# Patient Record
Sex: MALE | Race: WHITE | NOT HISPANIC OR LATINO | Employment: FULL TIME | URBAN - METROPOLITAN AREA
[De-identification: names, ages, dates, MRNs, and addresses within clinical notes are randomized per-mention and may not be internally consistent; named-entity substitution may affect disease eponyms.]

---
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
## 2021-01-04 ENCOUNTER — HEALTH MAINTENANCE LETTER (OUTPATIENT)
Age: 21
End: 2021-01-04

## 2021-02-12 ENCOUNTER — TELEPHONE (OUTPATIENT)
Dept: FAMILY MEDICINE | Facility: CLINIC | Age: 21
End: 2021-02-12

## 2021-02-12 NOTE — TELEPHONE ENCOUNTER
Reason for Call:  Other Establish Care / Dr Walker    Detailed comments: Patient previously saw Dr Espana who referred this patient to see Dr Walker as a new PCP. Please call to discuss.     Phone Number Patient can be reached at: Home number on file [phone] (home)    Best Time: any    Can we leave a detailed message on this number? YES    Call taken on 2/12/2021 at 4:47 PM by Leatha Lau

## 2021-02-17 ENCOUNTER — VIRTUAL VISIT (OUTPATIENT)
Dept: FAMILY MEDICINE | Facility: CLINIC | Age: 21
End: 2021-02-17
Payer: COMMERCIAL

## 2021-02-17 DIAGNOSIS — F90.0 ATTENTION DEFICIT HYPERACTIVITY DISORDER (ADHD), PREDOMINANTLY INATTENTIVE TYPE: ICD-10-CM

## 2021-02-17 DIAGNOSIS — F43.21 ADJUSTMENT DISORDER WITH DEPRESSED MOOD: Primary | ICD-10-CM

## 2021-02-17 PROCEDURE — 99214 OFFICE O/P EST MOD 30 MIN: CPT | Mod: TEL | Performed by: PHYSICIAN ASSISTANT

## 2021-02-17 RX ORDER — METHYLPHENIDATE HYDROCHLORIDE 54 MG/1
54 TABLET ORAL EVERY MORNING
Qty: 30 TABLET | Refills: 0 | Status: SHIPPED | OUTPATIENT
Start: 2021-02-17 | End: 2021-02-17

## 2021-02-17 RX ORDER — METHYLPHENIDATE HYDROCHLORIDE 54 MG/1
54 TABLET ORAL EVERY MORNING
Qty: 30 TABLET | Refills: 0 | Status: SHIPPED | OUTPATIENT
Start: 2021-03-17 | End: 2021-04-28

## 2021-02-17 RX ORDER — FLUOXETINE 40 MG/1
40 CAPSULE ORAL DAILY
Qty: 90 CAPSULE | Refills: 0 | Status: SHIPPED | OUTPATIENT
Start: 2021-02-17 | End: 2021-04-28

## 2021-02-17 ASSESSMENT — ANXIETY QUESTIONNAIRES
1. FEELING NERVOUS, ANXIOUS, OR ON EDGE: SEVERAL DAYS
6. BECOMING EASILY ANNOYED OR IRRITABLE: NOT AT ALL
5. BEING SO RESTLESS THAT IT IS HARD TO SIT STILL: NOT AT ALL
GAD7 TOTAL SCORE: 9
3. WORRYING TOO MUCH ABOUT DIFFERENT THINGS: NEARLY EVERY DAY
IF YOU CHECKED OFF ANY PROBLEMS ON THIS QUESTIONNAIRE, HOW DIFFICULT HAVE THESE PROBLEMS MADE IT FOR YOU TO DO YOUR WORK, TAKE CARE OF THINGS AT HOME, OR GET ALONG WITH OTHER PEOPLE: NOT DIFFICULT AT ALL
2. NOT BEING ABLE TO STOP OR CONTROL WORRYING: NEARLY EVERY DAY
7. FEELING AFRAID AS IF SOMETHING AWFUL MIGHT HAPPEN: SEVERAL DAYS

## 2021-02-17 ASSESSMENT — PATIENT HEALTH QUESTIONNAIRE - PHQ9
SUM OF ALL RESPONSES TO PHQ QUESTIONS 1-9: 9
5. POOR APPETITE OR OVEREATING: SEVERAL DAYS

## 2021-02-17 NOTE — PROGRESS NOTES
Landon is a 20 year old who is being evaluated via a billable telephone visit.      What phone number would you like to be contacted at? [phone]  How would you like to obtain your AVS? Mail a copy    Assessment & Plan       ICD-10-CM    1. Adjustment disorder with depressed mood  F43.21 FLUoxetine (PROZAC) 40 MG capsule   2. Attention deficit hyperactivity disorder (ADHD), predominantly inattentive type  F90.0 methylphenidate (CONCERTA) 54 MG CR tablet     DISCONTINUED: methylphenidate (CONCERTA) 54 MG CR tablet     Patient is tolerating current medication without any major side effects or concerns. Continue current treatment without any changes. Refills as above. Concerta sent x2, March refill will be kept on file so patient has enough supply to get to his appointment with Dr Walker. Further refills per new PCP.    Return in about 5 weeks (around 3/25/2021) for \[facility] [place] [facility]\"" care appt as scheduled.    NATASHA Rodriguez Redwood LLCREBECCA Gonzalez   Landon is a 20 year old who presents for the following health issues     HPI       Depression Followup - Fluoxetine     How are you doing with your depression since your last visit? No change    Are you having other symptoms that might be associated with depression? No    Have you had a significant life event?  No     Are you feeling anxious or having panic attacks?   Yes:  barely    Do you have any concerns with your use of alcohol or other drugs? No    Social History     Tobacco Use     Smoking status: Never Smoker     Smokeless tobacco: Never Used   Substance Use Topics     Alcohol use: Not Currently     Drug use: Never     PHQ 2/17/2021   PHQ-9 Total Score 9   Q9: Thoughts of better off dead/self-harm past 2 weeks Not at all     WANDER-7 SCORE 2/17/2021   Total Score 9     Last PHQ-9 2/17/2021   1.  Little interest or pleasure in doing things 1   2.  Feeling down, depressed, or hopeless 1   3.  Trouble falling or staying asleep, or sleeping  too much 3   4.  Feeling tired or having little energy 1   5.  Poor appetite or overeating 1   6.  Feeling bad about yourself 1   7.  Trouble concentrating 1   8.  Moving slowly or restless 0   Q9: Thoughts of better off dead/self-harm past 2 weeks 0   PHQ-9 Total Score 9   Difficulty at work, home, or with people Not difficult at all     WANDER-7  2/17/2021   1. Feeling nervous, anxious, or on edge 1   2. Not being able to stop or control worrying 3   3. Worrying too much about different things 3   4. Trouble relaxing 1   5. Being so restless that it is hard to sit still 0   6. Becoming easily annoyed or irritable 0   7. Feeling afraid, as if something awful might happen 1   WANDER-7 Total Score 9   If you checked any problems, how difficult have they made it for you to do your work, take care of things at home, or get along with other people? Not difficult at all       Suicide Assessment Five-step Evaluation and Treatment (SAFE-T)      How many servings of fruits and vegetables do you eat daily?  4 or more    On average, how many sweetened beverages do you drink each day (Examples: soda, juice, sweet tea, etc.  Do NOT count diet or artificially sweetened beverages)?   0    How many days per week do you exercise enough to make your heart beat faster? 2-3    How many minutes a day do you exercise enough to make your heart beat faster? 30 - 60    How many days per week do you miss taking your medication? 0    Medication Followup of Concerta    Taking Medication as prescribed: yes    Side Effects:  None    Medication Helping Symptoms:  yes     - Patient new to me. [place] [facility] [place] until [place]/25/2021. Needs refills of medications in the meantime.  - Reports mood stable on current dose of fluoxetine and it's working well. Concerta also working well for ADHD symptoms, takes daily, no drug holidays. Denies any issues with side effects.    Review of Systems    Constitutional, HEENT, cardiovascular, pulmonary, GI, , musculoskeletal, neuro, skin, endocrine and psych systems are negative, except as otherwise noted.      Objective           Vitals:  No vitals were obtained today due to virtual visit.    Physical Exam     PSYCH: Alert and oriented times 3; coherent speech, normal   rate and volume, able to articulate logical thoughts, able   to abstract reason, no tangential thoughts, no hallucinations   or delusions  His affect is normal  RESP: No cough, no audible wheezing, able to talk in full sentences  Remainder of exam unable to be completed due to telephone visits                Phone call duration: 5 minutes

## 2021-02-18 ASSESSMENT — ANXIETY QUESTIONNAIRES: GAD7 TOTAL SCORE: 9

## 2021-04-28 ENCOUNTER — VIRTUAL VISIT (OUTPATIENT)
Dept: FAMILY MEDICINE | Facility: CLINIC | Age: 21
End: 2021-04-28
Payer: COMMERCIAL

## 2021-04-28 DIAGNOSIS — F90.0 ATTENTION DEFICIT HYPERACTIVITY DISORDER (ADHD), PREDOMINANTLY INATTENTIVE TYPE: Primary | ICD-10-CM

## 2021-04-28 DIAGNOSIS — F43.21 ADJUSTMENT DISORDER WITH DEPRESSED MOOD: ICD-10-CM

## 2021-04-28 PROCEDURE — 99213 OFFICE O/P EST LOW 20 MIN: CPT | Mod: 95 | Performed by: INTERNAL MEDICINE

## 2021-04-28 RX ORDER — FLUOXETINE 40 MG/1
40 CAPSULE ORAL DAILY
Qty: 90 CAPSULE | Refills: 3 | Status: SHIPPED | OUTPATIENT
Start: 2021-04-28 | End: 2021-07-12

## 2021-04-28 RX ORDER — METHYLPHENIDATE HYDROCHLORIDE 54 MG/1
54 TABLET ORAL EVERY MORNING
Qty: 90 TABLET | Refills: 0 | Status: SHIPPED | OUTPATIENT
Start: 2021-04-28 | End: 2021-09-08

## 2021-04-28 NOTE — PROGRESS NOTES
Landon is a 20 year old who is being evaluated via a billable video visit.      How would you like to obtain your AVS? MyChart  If the video visit is dropped, the invitation should be resent by:   Will anyone else be joining your video visit?     Video Start Time: 1432    Assessment & Plan     Attention deficit hyperactivity disorder (ADHD), predominantly inattentive type  Symptoms seem to be very well controlled on 54 mg of methylphenidate once daily.  He does skip doses on his days off from work.  He is not having any negative side effects.  Continue current medication follow-up in 3 months.  - methylphenidate (CONCERTA) 54 MG CR tablet; Take 1 tablet (54 mg) by mouth every morning    Adjustment disorder with depressed mood  Mood well controlled on current Prozac, continue current dose.  - FLUoxetine (PROZAC) 40 MG capsule; Take 1 capsule (40 mg) by mouth daily      20 minutes spent on the date of the encounter doing chart review, history and exam, documentation and further activities per the note         Return in about 3 months (around 7/28/2021) for ADHD Check.  Patient instructed to return to clinic or contact us sooner if symptoms worsen or new symptoms develop.     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  He has had a very busy year.  He has a diagnosis of ADHD dating back to age 14.  He also has a history of adjustment disorder with depressed mood for which she has been taking Prozac and it works very great.  He also finds that his productivity at work is much improved by taking Concerta.  He does not have any's negative side effects from the Concerta.  Specifically, it does not disturb his sleep.  He does report that he has more control over his appetite when he takes Concerta which has been of benefit for his  wellbeing he has no other specific complaints and received 1 dose of the [employer] Covid vaccine already.      Review of Systems         Objective           Vitals:  No vitals were obtained today due to virtual visit.    Physical Exam   GENERAL: Healthy, alert and no distress  EYES: Eyes grossly normal to inspection.  No discharge or erythema, or obvious scleral/conjunctival abnormalities.  RESP: No audible wheeze, cough, or visible cyanosis.  No visible retractions or increased work of breathing.    SKIN: Visible skin clear. No significant rash, abnormal pigmentation or lesions.  NEURO: Cranial nerves grossly intact.  Mentation and speech appropriate for age.  PSYCH: Mentation appears normal, affect normal/bright, judgement and insight intact, normal speech and appearance well-groomed.                Video-Visit Details    Type of service:  Video Visit    Video End Time:1444    Originating Location (pt. Location): Other his car    Distant Location (provider location):  [facility]     Platform used for Video Visit: Chano

## 2021-04-28 NOTE — PROGRESS NOTES
"Landon is a 20 year old who is being evaluated via a billable video visit.      How would you like to obtain your AVS? MyChart  If the video visit is dropped, the invitation should be resent by: Text to cell phone: [phone]  Will anyone else be joining your video visit? No  {If patient encounters technical issues they should call [phone] :598333}    Video Start Time: {video visit start/end time for provider to select:152948}    {PROVIDER CHARTING PREFERENCE:406107}    Subjective   Landon is a 20 year old who presents for the following health issues     HPI     Follow up on medications    Review of Systems   {ROS COMP (Optional):290738}      Objective           Vitals:  No vitals were obtained today due to virtual visit.    Physical Exam   {video visit exam brief selected:106974::\"GENERAL: Healthy, alert and no distress\",\"EYES: Eyes grossly normal to inspection.  No discharge or erythema, or obvious scleral/conjunctival abnormalities.\",\"RESP: No audible wheeze, cough, or visible cyanosis.  No visible retractions or increased work of breathing.  \",\"SKIN: Visible skin clear. No significant rash, abnormal pigmentation or lesions.\",\"NEURO: Cranial nerves grossly intact.  Mentation and speech appropriate for age.\",\"PSYCH: Mentation appears normal, affect normal/bright, judgement and insight intact, normal speech and appearance well-groomed.\"}    {Diagnostic Test Results (Optional):911253}    {AMBULATORY ATTESTATION (Optional):665988}        Video-Visit Details    Type of service:  Video Visit    Video End Time:{video visit start/end time for provider to select:152948}    Originating Location (pt. Location): {video visit patient location:132971::\"Home\"}    Distant Location (provider location):  [facility]     Platform used for Video Visit: {Virtual Visit Platforms:006678::\"[employer]\"}  "

## 2021-07-09 DIAGNOSIS — F43.21 ADJUSTMENT DISORDER WITH DEPRESSED MOOD: ICD-10-CM

## 2021-07-09 NOTE — TELEPHONE ENCOUNTER
Reason for Call:  Medication or medication refill:    Do you use a [facility]?  Name of the pharmacy and phone number for the current request:[facility] [id number] [place] [facility], [place] [address]      Name of the medication requested: FLUoxetine (PROZAC) 40 MG capsule    Other request:     Can we leave a detailed message on this number? YES    Phone number patient can be reached at: Cell number on file:    Telephone Information:   Mobile [phone]       Best Time: anytime       Call taken on 7/9/2021 at 1:27 PM by Mayur Marks

## 2021-07-12 RX ORDER — FLUOXETINE 40 MG/1
40 CAPSULE ORAL DAILY
Qty: 90 CAPSULE | Refills: 3 | Status: SHIPPED | OUTPATIENT
Start: 2021-07-12 | End: 2021-11-24

## 2021-07-12 NOTE — TELEPHONE ENCOUNTER
PHQ 2/17/2021   PHQ-9 Total Score 9   Q9: Thoughts of better off dead/self-harm past 2 weeks Not at all     Routing refill request to provider for review/approval because:  Last PHQ9 score >5

## 2021-09-06 DIAGNOSIS — F90.0 ATTENTION DEFICIT HYPERACTIVITY DISORDER (ADHD), PREDOMINANTLY INATTENTIVE TYPE: ICD-10-CM

## 2021-09-06 NOTE — TELEPHONE ENCOUNTER
Reason for Call:  Medication or medication refill:    Do you use a [facility]?  Name of the pharmacy and phone number for the current request:  [facility] [id number] [place] [facility] - [place], [place] - [address]  [phone]    Name of the medication requested: methylphenidate (CONCERTA) 54 MG CR tablet    Other request:     Can we leave a detailed message on this number? YES    Phone number patient can be reached at: Cell number on file:    Telephone Information:   Mobile [phone]       Best Time: Any      Call taken on 9/6/2021 at 1:54 PM by Acacia Yeepz

## 2021-09-08 RX ORDER — METHYLPHENIDATE HYDROCHLORIDE 54 MG/1
54 TABLET ORAL EVERY MORNING
Qty: 30 TABLET | Refills: 0 | Status: SHIPPED | OUTPATIENT
Start: 2021-09-08 | End: 2021-11-24

## 2021-09-08 NOTE — TELEPHONE ENCOUNTER
methylphenidate (CONCERTA) 54 MG CR tablet 90 tablet 0 4/28/2021  No   Sig - Route: Take 1 tablet (54 mg) by mouth every morning - Oral   Sent to pharmacy as: Methylphenidate HCl ER 54 MG Oral Tablet Extended Release (CONCERTA)   Class: E-Prescribe   Earliest Fill Date: 4/28/2021   Order: [id number]   E-Prescribing Status: Receipt confirmed by pharmacy (4/28/2021  2:52 PM CDT)     Last visit 7/29/21     Routing refill request to provider for review/approval because:  Drug not on the [facility] refill protocol     Rosita MORALES RN

## 2021-09-08 NOTE — TELEPHONE ENCOUNTER
Please call pt. He needs appt before next refill. Only sent 30 days. He needs to be seen only by his physician.     Reviewed  and refilled   SHY Galaviz CNP

## 2021-10-10 ENCOUNTER — HEALTH MAINTENANCE LETTER (OUTPATIENT)
Age: 21
End: 2021-10-10

## 2021-11-24 ENCOUNTER — VIRTUAL VISIT (OUTPATIENT)
Dept: FAMILY MEDICINE | Facility: CLINIC | Age: 21
End: 2021-11-24
Payer: COMMERCIAL

## 2021-11-24 DIAGNOSIS — F43.21 ADJUSTMENT DISORDER WITH DEPRESSED MOOD: ICD-10-CM

## 2021-11-24 DIAGNOSIS — F90.0 ATTENTION DEFICIT HYPERACTIVITY DISORDER (ADHD), PREDOMINANTLY INATTENTIVE TYPE: ICD-10-CM

## 2021-11-24 PROCEDURE — 99213 OFFICE O/P EST LOW 20 MIN: CPT | Mod: 95 | Performed by: INTERNAL MEDICINE

## 2021-11-24 RX ORDER — METHYLPHENIDATE HYDROCHLORIDE 54 MG/1
54 TABLET ORAL EVERY MORNING
Qty: 30 TABLET | Refills: 0 | Status: SHIPPED | OUTPATIENT
Start: 2021-12-22 | End: 2021-11-24

## 2021-11-24 RX ORDER — METHYLPHENIDATE HYDROCHLORIDE 54 MG/1
54 TABLET ORAL EVERY MORNING
Qty: 30 TABLET | Refills: 0 | Status: CANCELLED | OUTPATIENT
Start: 2021-11-24

## 2021-11-24 RX ORDER — METHYLPHENIDATE HYDROCHLORIDE 54 MG/1
54 TABLET ORAL EVERY MORNING
Qty: 30 TABLET | Refills: 0 | Status: SHIPPED | OUTPATIENT
Start: 2021-11-24 | End: 2021-11-24

## 2021-11-24 RX ORDER — FLUOXETINE 40 MG/1
40 CAPSULE ORAL DAILY
Qty: 90 CAPSULE | Refills: 3 | Status: SHIPPED | OUTPATIENT
Start: 2021-11-24 | End: 2021-12-20

## 2021-11-24 RX ORDER — METHYLPHENIDATE HYDROCHLORIDE 54 MG/1
54 TABLET ORAL EVERY MORNING
Qty: 30 TABLET | Refills: 0 | Status: SHIPPED | OUTPATIENT
Start: 2022-01-22 | End: 2021-12-20

## 2021-11-24 NOTE — PROGRESS NOTES
Landon is a 21 year old who is being evaluated via a billable video visit.      How would you like to obtain your AVS? Mail a copy  If the video visit is dropped, the invitation should be resent by: Text to cell phone: [phone]  Will anyone else be joining your video visit? No    Video Start Time: 10:19    Assessment & Plan     Attention deficit hyperactivity disorder (ADHD), predominantly inattentive type  Stable continue current stimulant dose   - methylphenidate (CONCERTA) 54 MG CR tablet; Take 1 tablet (54 mg) by mouth every morning    Adjustment disorder with depressed mood  Well controlled; continue current prozac  - FLUoxetine (PROZAC) 40 MG capsule; Take 1 capsule (40 mg) by mouth daily      13 minutes spent on the date of the encounter doing chart review, history and exam, documentation and further activities per the note        Return in about 6 months (around 5/24/2022) for Preventive Visit and mood/ ADHD recheck .  Patient instructed to return to clinic or contact us sooner if symptoms worsen or new symptoms develop.     Kirill Walker MD  [facility]    Carlos Navarrete is a 21 year old who presents for the following health issues     Follow up ADHD  concerta working well  Needs refill  Skips doses on days off from work as a   Mood well controlled, but needs refill on prozac  Planning trip to [place] tomorrow for thanksgiving     Review of Systems         Objective           Vitals:  No vitals were obtained today due to virtual visit.    Physical Exam   GENERAL: Healthy, alert and no distress  EYES: Eyes grossly normal to inspection.  No discharge or erythema, or obvious scleral/conjunctival abnormalities.  RESP: No audible wheeze, cough, or visible cyanosis.  No visible retractions or increased work of breathing.    SKIN: Visible skin clear. No significant rash, abnormal pigmentation or lesions.  NEURO: Cranial nerves grossly intact.  Mentation and speech appropriate for  age.  PSYCH: Mentation appears normal, affect normal/bright, judgement and insight intact, normal speech and appearance well-groomed.                Video-Visit Details    Type of service:  Video Visit    Video End Time:10:29 AM    Originating Location (pt. Location): Home    Distant Location (provider location):  [facility]     Platform used for Video Visit: Chano

## 2021-12-17 DIAGNOSIS — F90.0 ATTENTION DEFICIT HYPERACTIVITY DISORDER (ADHD), PREDOMINANTLY INATTENTIVE TYPE: ICD-10-CM

## 2021-12-17 DIAGNOSIS — F43.21 ADJUSTMENT DISORDER WITH DEPRESSED MOOD: ICD-10-CM

## 2021-12-20 RX ORDER — FLUOXETINE 40 MG/1
40 CAPSULE ORAL DAILY
Qty: 90 CAPSULE | Refills: 3 | Status: SHIPPED | OUTPATIENT
Start: 2021-12-20 | End: 2022-06-23

## 2021-12-20 RX ORDER — METHYLPHENIDATE HYDROCHLORIDE 54 MG/1
54 TABLET ORAL EVERY MORNING
Qty: 30 TABLET | Refills: 0 | Status: SHIPPED | OUTPATIENT
Start: 2022-01-22 | End: 2021-12-23

## 2021-12-22 DIAGNOSIS — F90.0 ATTENTION DEFICIT HYPERACTIVITY DISORDER (ADHD), PREDOMINANTLY INATTENTIVE TYPE: ICD-10-CM

## 2021-12-22 NOTE — TELEPHONE ENCOUNTER
[employer] insurance called with pt's mother on the line     Pt is out of medication     PCP approved a script but the start date is for January, asking if we can call the pharmacy and okay a refill for now?       methylphenidate (CONCERTA) 54 MG CR tablet 30 tablet 0 1/22/2022  No   Sig - Route: Take 1 tablet (54 mg) by mouth every morning - Oral   Sent to pharmacy as: Methylphenidate HCl ER 54 MG Oral Tablet Extended Release (CONCERTA)   Class: E-Prescribe   Earliest Fill Date: 1/22/2022   Order: [id number]   E-Prescribing Status: Receipt confirmed by pharmacy (12/20/2021  5:29 PM CST)

## 2021-12-23 RX ORDER — METHYLPHENIDATE HYDROCHLORIDE 54 MG/1
54 TABLET ORAL EVERY MORNING
Qty: 30 TABLET | Refills: 0 | Status: SHIPPED | OUTPATIENT
Start: 2022-01-22 | End: 2022-02-16

## 2021-12-23 NOTE — TELEPHONE ENCOUNTER
Triage tried calling [facility] with verbal approval for early fill on Concerta. Triage unable to talk to anyone due to long hold time over 30 minutes. Left voice message asking mother to have [facility] call clinic for approval of early fill or triage can try calling [facility] back tomorrow.

## 2021-12-23 NOTE — TELEPHONE ENCOUNTER
Patient's father called regarding the patient's prescriptions. [facility] [place] states they do not have stock and are unable to fill these prescriptions. Aron stated he would call the pharmacy back and ask for the prescription of Fluoxetine to [facility]. The Methylphenidate needs to be sent to a different pharmacy by provider. Orders pended with different pharmacy.       Methylphenidate 54mg       Last Written Prescription Date:  1/22/22 (see below regarding early fill)  Last Fill Quantity: 30,   # refills: 0  Last Office Visit: 11/24/21  Denise   Future Office visit:       Routing refill request to provider for review/approval because:  Drug not on the [facility], P or  Health refill protocol or controlled substance    Nancy GARCIA RN  [facility]

## 2021-12-24 NOTE — TELEPHONE ENCOUNTER
"See below note from PCP \"ok to fill early\"     Pt's father called regarding this, he was at the [facility] pharmacy in [place]     Spoke with pharmacist at [facility] on [address] in [place]. They will refill today for pt    Rima Castro RN  [facility]     "

## 2022-01-06 ENCOUNTER — IMMUNIZATION (OUTPATIENT)
Dept: NURSING | Facility: CLINIC | Age: 22
End: 2022-01-06
Payer: COMMERCIAL

## 2022-01-06 DIAGNOSIS — Z23 HIGH PRIORITY FOR 2019-NCOV VACCINE: Primary | ICD-10-CM

## 2022-01-06 DIAGNOSIS — Z23 NEED FOR PROPHYLACTIC VACCINATION AND INOCULATION AGAINST INFLUENZA: ICD-10-CM

## 2022-01-06 PROCEDURE — 90686 IIV4 VACC NO PRSV 0.5 ML IM: CPT

## 2022-01-06 PROCEDURE — 91300 COVID-19,PF,PFIZER (12+ YRS): CPT

## 2022-01-06 PROCEDURE — 90471 IMMUNIZATION ADMIN: CPT

## 2022-01-06 PROCEDURE — 0004A COVID-19,PF,PFIZER (12+ YRS): CPT

## 2022-01-30 ENCOUNTER — HEALTH MAINTENANCE LETTER (OUTPATIENT)
Age: 22
End: 2022-01-30

## 2022-02-03 ENCOUNTER — TRANSFERRED RECORDS (OUTPATIENT)
Dept: HEALTH INFORMATION MANAGEMENT | Facility: CLINIC | Age: 22
End: 2022-02-03
Payer: COMMERCIAL

## 2022-02-16 ENCOUNTER — VIRTUAL VISIT (OUTPATIENT)
Dept: INTERNAL MEDICINE | Facility: CLINIC | Age: 22
End: 2022-02-16
Payer: COMMERCIAL

## 2022-02-16 DIAGNOSIS — F90.0 ATTENTION DEFICIT HYPERACTIVITY DISORDER (ADHD), PREDOMINANTLY INATTENTIVE TYPE: Primary | ICD-10-CM

## 2022-02-16 DIAGNOSIS — F33.42 MAJOR DEPRESSIVE DISORDER, RECURRENT, IN FULL REMISSION (H): ICD-10-CM

## 2022-02-16 PROCEDURE — 99213 OFFICE O/P EST LOW 20 MIN: CPT | Mod: 95 | Performed by: INTERNAL MEDICINE

## 2022-02-16 RX ORDER — METHYLPHENIDATE HYDROCHLORIDE 54 MG/1
54 TABLET ORAL EVERY MORNING
Qty: 30 TABLET | Refills: 0 | Status: SHIPPED | OUTPATIENT
Start: 2022-02-16 | End: 2022-03-17

## 2022-02-16 NOTE — PROGRESS NOTES
Landon is a 21 year old who is being evaluated via a billable video visit.      How would you like to obtain your AVS? MyChart  If the video visit is dropped, the invitation should be resent by: Send to e-mail at: [email or web address]  Will anyone else be joining your video visit? No  Video Start Time: 5:56pm    Assessment & Plan     (F90.0) Attention deficit hyperactivity disorder (ADHD), predominantly inattentive type  Comment: chronic  Plan: methylphenidate (CONCERTA) 54 MG CR tablet        Reviewed chart. Stable on concerta for years. Renewed. Recommended in-person visit this summer for bp check and HM updates.      Return in about 6 months (around 8/16/2022) for Follow up, with me.    Juan Tidwell MD  [facility]    Subjective   Landon is a 21 year old who presents for the following health issues     HPI   Pt attends the video visit to establish care and have his medications renewed.  Depression and ADHD  Both well controlled on medication.        Objective           Vitals:  No vitals were obtained today due to virtual visit.    Physical Exam   GENERAL: Healthy, alert and no distress  EYES: Eyes grossly normal to inspection.  No discharge or erythema, or obvious scleral/conjunctival abnormalities.  RESP: No audible wheeze, cough, or visible cyanosis.  No visible retractions or increased work of breathing.    SKIN: Visible skin clear. No significant rash, abnormal pigmentation or lesions.  NEURO: Cranial nerves grossly intact.  Mentation and speech appropriate for age.  PSYCH: Mentation appears normal, affect normal/bright, judgement and insight intact, normal speech and appearance well-groomed.        Video-Visit Details    Type of service:  Video Visit    Video End Time:6:03PM    Originating Location (pt. Location): Home    Distant Location (provider location):  [facility]     Platform used for Video Visit: Actionality

## 2022-03-06 ENCOUNTER — HEALTH MAINTENANCE LETTER (OUTPATIENT)
Age: 22
End: 2022-03-06

## 2022-03-16 ENCOUNTER — MYC MEDICAL ADVICE (OUTPATIENT)
Dept: INTERNAL MEDICINE | Facility: CLINIC | Age: 22
End: 2022-03-16
Payer: COMMERCIAL

## 2022-03-17 ENCOUNTER — MYC REFILL (OUTPATIENT)
Dept: INTERNAL MEDICINE | Facility: CLINIC | Age: 22
End: 2022-03-17
Payer: COMMERCIAL

## 2022-03-17 DIAGNOSIS — F90.0 ATTENTION DEFICIT HYPERACTIVITY DISORDER (ADHD), PREDOMINANTLY INATTENTIVE TYPE: ICD-10-CM

## 2022-03-18 RX ORDER — METHYLPHENIDATE HYDROCHLORIDE 54 MG/1
54 TABLET ORAL EVERY MORNING
Qty: 30 TABLET | Refills: 0 | Status: SHIPPED | OUTPATIENT
Start: 2022-03-18 | End: 2022-04-20

## 2022-03-18 NOTE — TELEPHONE ENCOUNTER
Medication: methylphenidate      CSA in last year: NO    Random Utox in last year: NO  (if any of the above answer NO - schedule with PCP)     On opioids in addition to benzodiazepines? NO  (if the above answer YES - schedule with PCP every 6 months)           PROVIDER TO PULL THE FOLLOWING FROM  :    1. Last date filled?  2.   Only PCP Prescribing?  3.   Taken as prescribed from physician notes?

## 2022-04-20 ENCOUNTER — MYC REFILL (OUTPATIENT)
Dept: INTERNAL MEDICINE | Facility: CLINIC | Age: 22
End: 2022-04-20
Payer: COMMERCIAL

## 2022-04-20 DIAGNOSIS — F90.0 ATTENTION DEFICIT HYPERACTIVITY DISORDER (ADHD), PREDOMINANTLY INATTENTIVE TYPE: ICD-10-CM

## 2022-04-21 ENCOUNTER — MYC REFILL (OUTPATIENT)
Dept: INTERNAL MEDICINE | Facility: CLINIC | Age: 22
End: 2022-04-21
Payer: COMMERCIAL

## 2022-04-21 DIAGNOSIS — F90.0 ATTENTION DEFICIT HYPERACTIVITY DISORDER (ADHD), PREDOMINANTLY INATTENTIVE TYPE: ICD-10-CM

## 2022-04-22 NOTE — TELEPHONE ENCOUNTER
Routing refill request to provider for review/approval because:  Controlled substance request    Last Written Prescription Date:  3/18/22  Last Fill Quantity: 30,  # refills: 0   Last office visit provider:  2/16/22     Requested Prescriptions   Pending Prescriptions Disp Refills     methylphenidate (CONCERTA) 54 MG CR tablet 30 tablet 0     Sig: Take 1 tablet (54 mg) by mouth every morning       There is no refill protocol information for this order          Libby Ramirez 04/22/22 6:52 PM

## 2022-04-24 RX ORDER — METHYLPHENIDATE HYDROCHLORIDE 54 MG/1
54 TABLET ORAL EVERY MORNING
Qty: 30 TABLET | Refills: 0 | OUTPATIENT
Start: 2022-04-24

## 2022-04-24 NOTE — TELEPHONE ENCOUNTER
Duplicate request    Requested Prescriptions   Pending Prescriptions Disp Refills     methylphenidate (CONCERTA) 54 MG CR tablet 30 tablet 0     Sig: Take 1 tablet (54 mg) by mouth every morning       There is no refill protocol information for this order          Susan Thorne RN 04/24/22 1:55 PM

## 2022-04-25 RX ORDER — METHYLPHENIDATE HYDROCHLORIDE 54 MG/1
54 TABLET ORAL EVERY MORNING
Qty: 30 TABLET | Refills: 0 | Status: SHIPPED | OUTPATIENT
Start: 2022-04-25 | End: 2022-05-26

## 2022-04-25 NOTE — TELEPHONE ENCOUNTER
Spoke with patient to clarify his PCP. Patient states he will be getting his care from Dr Walker. Writer advised patient NOT to ask for scripts from other providers. This will prevent confusion in the future.      Controlled Substance Refill Request for Concerta    Last refill: 3/18/2022 for 30 with 0    Last clinic visit: 2/16/2022     Clinic visit frequency required: Q 6  months  Next appt: 6/6/2022    Controlled substance agreement on file: No.    Documentation in problem list reviewed:  Yes    Processing:  Rx to be electronically transmitted to pharmacy by provider      Feng Acevedo RN  [facility]

## 2022-05-26 ENCOUNTER — MYC REFILL (OUTPATIENT)
Dept: INTERNAL MEDICINE | Facility: CLINIC | Age: 22
End: 2022-05-26
Payer: COMMERCIAL

## 2022-05-26 DIAGNOSIS — F90.0 ATTENTION DEFICIT HYPERACTIVITY DISORDER (ADHD), PREDOMINANTLY INATTENTIVE TYPE: ICD-10-CM

## 2022-05-27 RX ORDER — METHYLPHENIDATE HYDROCHLORIDE 54 MG/1
54 TABLET ORAL EVERY MORNING
Qty: 30 TABLET | Refills: 0 | Status: SHIPPED | OUTPATIENT
Start: 2022-05-27 | End: 2022-06-23

## 2022-05-27 NOTE — TELEPHONE ENCOUNTER
Routing refill request to provider for review/approval because:  Drug not on the [facility] refill protocol     Edgar Rey RN  [facility] Triage Nurse

## 2022-05-29 ENCOUNTER — OFFICE VISIT (OUTPATIENT)
Dept: URGENT CARE | Facility: URGENT CARE | Age: 22
End: 2022-05-29
Payer: COMMERCIAL

## 2022-05-29 VITALS
OXYGEN SATURATION: 97 % | BODY MASS INDEX: 33.75 KG/M2 | DIASTOLIC BLOOD PRESSURE: 90 MMHG | SYSTOLIC BLOOD PRESSURE: 146 MMHG | WEIGHT: 222 LBS | RESPIRATION RATE: 18 BRPM | HEART RATE: 104 BPM | TEMPERATURE: 98.1 F

## 2022-05-29 DIAGNOSIS — R05.9 COUGH: ICD-10-CM

## 2022-05-29 DIAGNOSIS — H66.001 ACUTE SUPPURATIVE OTITIS MEDIA OF RIGHT EAR WITHOUT SPONTANEOUS RUPTURE OF TYMPANIC MEMBRANE, RECURRENCE NOT SPECIFIED: Primary | ICD-10-CM

## 2022-05-29 PROCEDURE — 99213 OFFICE O/P EST LOW 20 MIN: CPT | Performed by: FAMILY MEDICINE

## 2022-05-29 RX ORDER — AMOXICILLIN 875 MG
875 TABLET ORAL 2 TIMES DAILY
Qty: 20 TABLET | Refills: 0 | Status: SHIPPED | OUTPATIENT
Start: 2022-05-29 | End: 2022-06-08

## 2022-05-29 NOTE — PROGRESS NOTES
Concerns addressed at visit on 5/4/22. Encounter will be closed.   SUBJECTIVE: Landon Thompson is a 21 year old male presenting with a chief complaint of nasal congestion, cough  and rt ear plugged.  Onset of symptoms was 1 week(s) ago.  Course of illness is worsening.    Severity moderate  Current and Associated symptoms: cough - productive  Predisposing factors include None.    No past medical history on file.  Allergies   Allergen Reactions     No Known Allergies      Social History     Tobacco Use     Smoking status: Never Smoker     Smokeless tobacco: Never Used   Substance Use Topics     Alcohol use: Not Currently       ROS:  SKIN: no rash  GI: no vomiting    OBJECTIVE:  BP (!) 146/90   Pulse 104   Temp 98.1  F (36.7  C) (Tympanic)   Resp 18   Wt 100.7 kg (222 lb)   SpO2 97%   BMI 33.75 kg/m  GENERAL APPEARANCE: healthy, alert and no distress  EYES: EOMI,  PERRL, conjunctiva clear  HENT: TM erythematous right, rhinorrhea clear and oral mucous membranes moist, no erythema noted  RESP: lungs clear to auscultation - no rales, rhonchi or wheezes  SKIN: no suspicious lesions or rashes      ICD-10-CM    1. Acute suppurative otitis media of right ear without spontaneous rupture of tympanic membrane, recurrence not specified  H66.001 amoxicillin (AMOXIL) 875 MG tablet   2. Cough  R05.9        Fluids/Rest, f/u if worse/not any better

## 2022-06-22 NOTE — PROGRESS NOTES
"SUBJECTIVE:   Landon Thompson is a 21 year old male who presents for Preventive Visit.    {Split Bill scripting  The purpose of this visit is to discuss your medical history and prevent health problems before you are sick. You may be responsible for a co-pay, coinsurance, or deductible if your visit today includes services such as checking on a sore throat, having an x-ray or lab test, or treating and evaluating a new or existing condition :101358}  {Patient advised of split billing (Optional):518149}  Are you in the first 12 months of your Medicare coverage?  { :600419::\"No\"}    HPI  Do you feel safe in your environment? { :055700}    Have you ever done Advance Care Planning? (For example, a Health Directive, POLST, or a discussion with a medical provider or your loved ones about your wishes): { :252186}    {Hearing Test Done (Optional):560465}   Fall risk  { :300190}  {If any of the above assessments are answered yes, consider ordering appropriate referrals (Optional):127809::\"click delete button to remove this line now\"}  Cognitive Screening { :551170}    {Do you have sleep apnea, excessive snoring or daytime drowsiness? (Optional):155950}    Reviewed and updated as needed this visit by clinical staff                    Reviewed and updated as needed this visit by Provider                   Social History     Tobacco Use     Smoking status: Never Smoker     Smokeless tobacco: Never Used   Substance Use Topics     Alcohol use: Not Currently     {Rooming Staff- Complete this question if Prescreen response is not shown below for today's visit. If you drink alcohol do you typically have >3 drinks per day or >7 drinks per week? (Optional):251903}    No flowsheet data found.{add AUDIT responses (Optional) (A score of 7 for adult men is an indication of hazardous drinking; a score of 8 or more is an indication of an alcohol use disorder.  A score of 7 or more for adult women is an indication of hazardous drinking or an " "alchohol use disorder):189985}    {Outside tests to abstract? :840026}    {additional problems to add (Optional):780783}    Current providers sharing in care for this patient include: {Rooming staff:  Please update Care Team in Rooming Activity, refresh this note and then delete this statement}  Patient Care Team:  Kirill Walker MD as PCP - General (Internal Medicine)  Kirill Walker MD as Assigned PCP    The following health maintenance items are reviewed in Epic and correct as of today:  Health Maintenance Due   Topic Date Due     ADVANCE CARE PLANNING  Never done     DEPRESSION ACTION PLAN  Never done     HIV SCREENING  Never done     HEPATITIS C SCREENING  Never done     PREVENTIVE CARE VISIT  2021     PHQ-9  2021     {Chronicprobdata (optional):443430}  {Decision Support (Optional):168085}        Review of Systems  {ROS COMP (Optional):476114}    OBJECTIVE:   There were no vitals taken for this visit. Estimated body mass index is 33.75 kg/m  as calculated from the following:    Height as of 20: 1.727 m (5' 8\").    Weight as of 22: 100.7 kg (222 lb).  Physical Exam  {Exam (Optional) :999354}    {Diagnostic Test Results (Optional):605486::\"Diagnostic Test Results:\",\"Labs reviewed in Epic\"}    ASSESSMENT / PLAN:   {Diag Picklist:542495}    {Patient advised of split billing (Optional):985472}    COUNSELING:  {Medicare Counselin}    Estimated body mass index is 33.75 kg/m  as calculated from the following:    Height as of 20: 1.727 m (5' 8\").    Weight as of 22: 100.7 kg (222 lb).    {Weight Management Plan (ACO) Complete if BMI is abnormal-  Ages 18-64  BMI >24.9.  Age 65+ with BMI <23 or >30 (Optional):294421}    He reports that he has never smoked. He has never used smokeless tobacco.      Appropriate preventive services were discussed with this patient, including applicable screening as appropriate for cardiovascular disease, diabetes, osteopenia/osteoporosis, and " glaucoma.  As appropriate for age/gender, discussed screening for colorectal cancer, prostate cancer, breast cancer, and cervical cancer. Checklist reviewing preventive services available has been given to the patient.    Reviewed patients plan of care and provided an AVS. The {CarePlan:775548} for Landon meets the Care Plan requirement. This Care Plan has been established and reviewed with the {PATIENT, FAMILY MEMBER, CAREGIVER:777802}.    Counseling Resources:  ATP IV Guidelines  Pooled Cohorts Equation Calculator  Breast Cancer Risk Calculator  Breast Cancer: Medication to Reduce Risk  FRAX Risk Assessment  ICSI Preventive Guidelines  Dietary Guidelines for Americans, 2010  USDA's MyPlate  ASA Prophylaxis  Lung CA Screening    Kirill Walker MD  [facility]    Identified Health Risks:

## 2022-06-23 ENCOUNTER — OFFICE VISIT (OUTPATIENT)
Dept: FAMILY MEDICINE | Facility: CLINIC | Age: 22
End: 2022-06-23
Payer: COMMERCIAL

## 2022-06-23 VITALS
WEIGHT: 214 LBS | HEART RATE: 69 BPM | HEIGHT: 68 IN | SYSTOLIC BLOOD PRESSURE: 121 MMHG | DIASTOLIC BLOOD PRESSURE: 76 MMHG | BODY MASS INDEX: 32.43 KG/M2 | OXYGEN SATURATION: 96 % | RESPIRATION RATE: 16 BRPM

## 2022-06-23 DIAGNOSIS — Z00.00 ROUTINE GENERAL MEDICAL EXAMINATION AT A HEALTH CARE FACILITY: Primary | ICD-10-CM

## 2022-06-23 DIAGNOSIS — Z11.59 NEED FOR HEPATITIS C SCREENING TEST: ICD-10-CM

## 2022-06-23 DIAGNOSIS — F33.42 MAJOR DEPRESSIVE DISORDER, RECURRENT, IN FULL REMISSION (H): ICD-10-CM

## 2022-06-23 DIAGNOSIS — Z11.4 SCREENING FOR HIV (HUMAN IMMUNODEFICIENCY VIRUS): ICD-10-CM

## 2022-06-23 DIAGNOSIS — F90.0 ATTENTION DEFICIT HYPERACTIVITY DISORDER (ADHD), PREDOMINANTLY INATTENTIVE TYPE: ICD-10-CM

## 2022-06-23 PROCEDURE — 99395 PREV VISIT EST AGE 18-39: CPT | Performed by: INTERNAL MEDICINE

## 2022-06-23 RX ORDER — FLUOXETINE 40 MG/1
40 CAPSULE ORAL DAILY
Qty: 90 CAPSULE | Refills: 3 | Status: SHIPPED | OUTPATIENT
Start: 2022-06-23 | End: 2023-06-12

## 2022-06-23 RX ORDER — METHYLPHENIDATE HYDROCHLORIDE 54 MG/1
54 TABLET ORAL EVERY MORNING
Qty: 30 TABLET | Refills: 0 | Status: SHIPPED | OUTPATIENT
Start: 2022-06-23 | End: 2022-07-26

## 2022-06-23 ASSESSMENT — ENCOUNTER SYMPTOMS
WEAKNESS: 0
ABDOMINAL PAIN: 0
FREQUENCY: 0
HEARTBURN: 0
PARESTHESIAS: 0
HEMATOCHEZIA: 0
MYALGIAS: 0
CONSTIPATION: 0
FEVER: 0
ARTHRALGIAS: 0
HEMATURIA: 0
EYE PAIN: 0
HEADACHES: 0
SORE THROAT: 0
SHORTNESS OF BREATH: 0
JOINT SWELLING: 0
DIZZINESS: 0
COUGH: 0
CHILLS: 0
NAUSEA: 0
DYSURIA: 0
PALPITATIONS: 0
NERVOUS/ANXIOUS: 0
DIARRHEA: 0

## 2022-06-23 ASSESSMENT — PATIENT HEALTH QUESTIONNAIRE - PHQ9
SUM OF ALL RESPONSES TO PHQ QUESTIONS 1-9: 12
SUM OF ALL RESPONSES TO PHQ QUESTIONS 1-9: 12
10. IF YOU CHECKED OFF ANY PROBLEMS, HOW DIFFICULT HAVE THESE PROBLEMS MADE IT FOR YOU TO DO YOUR WORK, TAKE CARE OF THINGS AT HOME, OR GET ALONG WITH OTHER PEOPLE: SOMEWHAT DIFFICULT

## 2022-06-23 ASSESSMENT — PAIN SCALES - GENERAL: PAINLEVEL: MODERATE PAIN (4)

## 2022-06-23 NOTE — COMMUNITY RESOURCES LIST (ENGLISH)
06/23/2022   [facility] Outpatient Clin[facility]  Ingrid Davis  For questions about this resource list or additional care needs, please contact your primary care clinic or care manager.  Phone: [phone]   Email: N/A   Address: [address] [place] [address]   Hours: N/A        Hotlines and Helplines       Hotline - Crisis help  1  [facility] Distance: 4.48 miles      COVID-19 Status: Phone/Virtual   [address] [place] [address]  Language: English  Hours: Mon - Sun Open 24 Hours   Phone: [phone]) 581-7586 Website: http://www.crisisnursery.org     2  [facility] Distance: 5.23 miles      COVID-19 Status: Phone/Virtual   [address] [place] [address]  Language: English, Singaporean, Mandarin, Emirati, Swahili  Hours: Mon - Sun Open 24 Hours   Phone: [phone]) [phone] Email: info@Rpptrip.com.org Website: http://www.[employer].org/          Mental Health       Individual counseling  3  [facility], [facility]. [facility] Distance: 0.27 miles      COVID-19 Status: Regular Operations   [address] [place] [address]  Language: English, Emirati  Hours: Mon - Thu 8:00 AM - 8:00 PM , Fri 8:00 AM - 5:00 PM , Sat 8:00 AM - 12:00 PM  Fees: Insurance, Self Pay   Phone: (569) 249-3816 Website: http://www.[employer]     4  [facility] Distance: 0.35 miles      COVID-19 Status: Regular Operations, COVID-19 Status: Phone/Virtual   [address] [place] [address]  Language: Lithuanian, English  Hours: Mon - Fri Appt. Only  Fees: Free   Phone: [phone]) [phone] Email: Ramiro@[email or web address]     Mental health crisis care  5  [facility] Acute [facility]iat[facility] Distance: 7.71 miles      COVID-19 Status: Regular Operations   [address] [place] [address]  Language: English  Hours: Mon - Sun Open 24 Hours  Fees: Insurance, Self Pay, Sliding Fee   Phone: (978) [phone] Website:  https://www.[email or web address].org/specialty/psychiatry/acute-psychiatry-services/     6  [facility]iatric Emergencies (COPE) Distance: 7.88 miles      COVID-19 Status: Regular Operations, COVID-19 Status: Phone/Virtual   [address] [place] [address]  Language: English, Bhutanese, Lithuanian  Hours: Mon - Sun Open 24 Hours  Fees: Free   Phone: (243) 361-7183 Email: [email or web address]. Website: [email or web address]://www.[email or web address]./residents/emergencies/mental-health-emergencies     Mental health support group  7  [facility], [facility] - Hoarding Classes Distance: 1.44 miles      COVID-19 Status: Phone/Virtual   [address] [place] [address]  Language: English  Hours: Tue - Fri 8:00 AM - 7:00 PM , Sat 9:00 AM - 4:00 PM  Fees: Insurance, Self Pay   Phone: (450) 537-9322 Email: [email or web address]@Spontly Website: http://www.iiyuma/     8  [facility] - Alzheimer's Caregiver Support Group Distance: 1.81 miles      COVID-19 Status: Phone/Virtual   [address] [place] [address]  Language: English  Hours: Mon - Thu 9:30 AM - 3:00 PM  Fees: Free   Phone: (656) 460-3400 Email: info@[employer].org Website: http://www.[facility].org/          Important Numbers & Websites       Emergency Services   911  [facility] Services   311  Poison Control   (798) 369-4071  Suicide Prevention Lifeline   (560) 475-6487 (TALK)  Child Abuse Hotline   (423) 128-3857 (4-A-Child)  Sexual Assault Hotline   (381) 375-1592 (HOPE)  National Runaway Safeline   (585) 937-1486 (RUNAWAY)  All-Options Talkline   (944) 883-5523  Substance Abuse Referral   (811) 210-7265 (HELP)

## 2022-06-23 NOTE — PROGRESS NOTES
SUBJECTIVE:   CC: Landon Thompson is an 21 year old male who presents for preventative health visit.       Patient has been advised of split billing requirements and indicates understanding: Yes  Healthy Habits:     Getting at least 3 servings of Calcium per day:  Yes    Bi-annual eye exam:  NO    Dental care twice a year:  NO    Sleep apnea or symptoms of sleep apnea:  None    Diet:  Regular (no restrictions)    Frequency of exercise:  1 day/week    Duration of exercise:  N/A    Taking medications regularly:  Yes    Barriers to taking medications:  None    Medication side effects:  None    PHQ-2 Total Score: 2    Additional concerns today:  No      Today's PHQ-2 Score:   PHQ-2 ( 1999 Pfizer) 6/23/2022   Q1: Little interest or pleasure in doing things 0   Q2: Feeling down, depressed or hopeless 0   PHQ-2 Score 0   PHQ-2 Total Score (12-17 Years)- Positive if 3 or more points; Administer PHQ-A if positive -   Q1: Little interest or pleasure in doing things Several days   Q2: Feeling down, depressed or hopeless Several days   PHQ-2 Score 2       Abuse: Current or Past(Physical, Sexual or Emotional)- No  Do you feel safe in your environment? Yes    Have you ever done Advance Care Planning? (For example, a Health Directive, POLST, or a discussion with a medical provider or your loved ones about your wishes): No, advance care planning information given to patient to review.  Patient plans to discuss their wishes with loved ones or provider.      Social History     Tobacco Use     Smoking status: Never Smoker     Smokeless tobacco: Never Used   Substance Use Topics     Alcohol use: Not Currently     If you drink alcohol do you typically have >3 drinks per day or >7 drinks per week? No    Alcohol Use 6/23/2022   Prescreen: >3 drinks/day or >7 drinks/week? No   Prescreen: >3 drinks/day or >7 drinks/week? -       Last PSA: No results found for: PSA    Reviewed orders with patient. Reviewed health maintenance and updated  orders accordingly - Yes  Patient Active Problem List   Diagnosis     Major depressive disorder, recurrent, in full remission (H)     Attention deficit hyperactivity disorder (ADHD), predominantly inattentive type     Past Surgical History:   Procedure Laterality Date     WISDOM TOOTH EXTRACTION         Social History     Tobacco Use     Smoking status: Never Smoker     Smokeless tobacco: Never Used   Substance Use Topics     Alcohol use: Not Currently     No family history on file.      Current Outpatient Medications   Medication Sig Dispense Refill     methylphenidate (CONCERTA) 54 MG CR tablet Take 1 tablet (54 mg) by mouth every morning 30 tablet 0     FLUoxetine (PROZAC) 40 MG capsule Take 1 capsule (40 mg) by mouth daily (Patient not taking: Reported on 6/23/2022) 90 capsule 3     Allergies   Allergen Reactions     No Known Allergies        Reviewed and updated as needed this visit by clinical staff   Tobacco   Meds  Problems  Med Hx  Surg Hx             Reviewed and updated as needed this visit by Provider   Tobacco     Med Hx             History reviewed. No pertinent past medical history.   Past Surgical History:   Procedure Laterality Date     WISDOM TOOTH EXTRACTION         Review of Systems   Constitutional: Negative for chills and fever.   HENT: Negative for congestion, ear pain, hearing loss and sore throat.    Eyes: Negative for pain and visual disturbance.   Respiratory: Negative for cough and shortness of breath.    Cardiovascular: Negative for chest pain, palpitations and peripheral edema.   Gastrointestinal: Negative for abdominal pain, constipation, diarrhea, heartburn, hematochezia and nausea.   Genitourinary: Negative for dysuria, frequency, genital sores, hematuria and urgency.   Musculoskeletal: Negative for arthralgias, joint swelling and myalgias.   Skin: Negative for rash.   Neurological: Negative for dizziness, weakness, headaches and paresthesias.   Psychiatric/Behavioral:  "Negative for mood changes. The patient is not nervous/anxious.        OBJECTIVE:   /76 (BP Location: Right arm, Patient Position: Chair, Cuff Size: Adult Large)   Pulse 69   Resp 16   Ht 1.727 m (5' 8\")   Wt 97.1 kg (214 lb)   SpO2 96%   BMI 32.54 kg/m      Physical Exam  GENERAL: healthy, alert and no distress  EYES: Eyes grossly normal to inspection, PERRL and conjunctivae and sclerae normal  HENT: ear canals and TM's normal, nose and mouth without ulcers or lesions  NECK: no adenopathy, no asymmetry, masses, or scars and thyroid normal to palpation  RESP: lungs clear to auscultation - no rales, rhonchi or wheezes  CV: regular rate and rhythm, normal S1 S2, no S3 or S4, no murmur, click or rub, no peripheral edema and peripheral pulses strong  ABDOMEN: soft, nontender, no hepatosplenomegaly, no masses and bowel sounds normal   (male): normal male genitalia without lesions or urethral discharge, no hernia  MS: no gross musculoskeletal defects noted, no edema  SKIN: no suspicious lesions or rashes  NEURO: Normal strength and tone, mentation intact and speech normal  PSYCH: mentation appears normal, affect normal/bright    Labs pending     ASSESSMENT/PLAN:       ICD-10-CM    1. Routine general medical examination at a health care facility  Z00.00 Comprehensive metabolic panel     Lipid panel reflex to direct LDL Fasting     CBC with platelets   2. Major depressive disorder, recurrent, in full remission (H)  F33.42 FLUoxetine (PROZAC) 40 MG capsule   3. Attention deficit hyperactivity disorder (ADHD), predominantly inattentive type  F90.0 methylphenidate (CONCERTA) 54 MG CR tablet   4. Screening for HIV (human immunodeficiency virus)  Z11.4 HIV Antigen Antibody Combo   5. Need for hepatitis C screening test  Z11.59 Hepatitis C Screen Reflex to HCV RNA Quant and Genotype     He ran out of prozac, will restart at previous dose  ADD symptoms ok on concerta, he will increase exercise too to help with " "concentration   Discussed strategies to promote weight loss too  Return to lab fasting when convenient     Patient has been advised of split billing requirements and indicates understanding: Yes    COUNSELING:   Reviewed preventive health counseling, as reflected in patient instructions  Special attention given to:        Regular exercise       Healthy diet/nutrition       Immunizations    Vaccines are up to date              Consider Hep C screening for all patients one time for ages 18-79 years       HIV screeninx in teen years, 1x in adult years, and at intervals if high risk    Estimated body mass index is 32.54 kg/m  as calculated from the following:    Height as of this encounter: 1.727 m (5' 8\").    Weight as of this encounter: 97.1 kg (214 lb).     Weight management plan: Discussed healthy diet and exercise guidelines    He reports that he has never smoked. He has never used smokeless tobacco.      Counseling Resources:  ATP IV Guidelines  Pooled Cohorts Equation Calculator  FRAX Risk Assessment  ICSI Preventive Guidelines  Dietary Guidelines for Americans,   USDA's MyPlate  ASA Prophylaxis  Lung CA Screening    Kirill Walker MD  [facility]  Answers for HPI/ROS submitted by the patient on 2022  If you checked off any problems, how difficult have these problems made it for you to do your work, take care of things at home, or get along with other people?: Somewhat difficult  PHQ9 TOTAL SCORE: 12      "

## 2022-07-26 ENCOUNTER — MYC REFILL (OUTPATIENT)
Dept: FAMILY MEDICINE | Facility: CLINIC | Age: 22
End: 2022-07-26

## 2022-07-26 DIAGNOSIS — F90.0 ATTENTION DEFICIT HYPERACTIVITY DISORDER (ADHD), PREDOMINANTLY INATTENTIVE TYPE: ICD-10-CM

## 2022-07-26 NOTE — TELEPHONE ENCOUNTER
Routing refill request to provider for review/approval because:  Drug not on the [facility] refill protocol   Joy Luna RN

## 2022-07-27 RX ORDER — METHYLPHENIDATE HYDROCHLORIDE 54 MG/1
54 TABLET ORAL EVERY MORNING
Qty: 30 TABLET | Refills: 0 | Status: SHIPPED | OUTPATIENT
Start: 2022-07-27 | End: 2022-08-29

## 2022-08-28 ENCOUNTER — MYC REFILL (OUTPATIENT)
Dept: FAMILY MEDICINE | Facility: CLINIC | Age: 22
End: 2022-08-28

## 2022-08-28 DIAGNOSIS — F90.0 ATTENTION DEFICIT HYPERACTIVITY DISORDER (ADHD), PREDOMINANTLY INATTENTIVE TYPE: ICD-10-CM

## 2022-08-29 ENCOUNTER — MYC REFILL (OUTPATIENT)
Dept: FAMILY MEDICINE | Facility: CLINIC | Age: 22
End: 2022-08-29

## 2022-08-29 DIAGNOSIS — F90.0 ATTENTION DEFICIT HYPERACTIVITY DISORDER (ADHD), PREDOMINANTLY INATTENTIVE TYPE: ICD-10-CM

## 2022-08-29 RX ORDER — METHYLPHENIDATE HYDROCHLORIDE 54 MG/1
54 TABLET ORAL EVERY MORNING
Qty: 30 TABLET | Refills: 0 | Status: SHIPPED | OUTPATIENT
Start: 2022-08-29 | End: 2022-09-28

## 2022-08-29 RX ORDER — METHYLPHENIDATE HYDROCHLORIDE 54 MG/1
54 TABLET ORAL EVERY MORNING
Qty: 30 TABLET | Refills: 0 | Status: SHIPPED | OUTPATIENT
Start: 2022-08-29 | End: 2023-11-09

## 2022-09-28 ENCOUNTER — MYC REFILL (OUTPATIENT)
Dept: FAMILY MEDICINE | Facility: CLINIC | Age: 22
End: 2022-09-28

## 2022-09-28 DIAGNOSIS — F90.0 ATTENTION DEFICIT HYPERACTIVITY DISORDER (ADHD), PREDOMINANTLY INATTENTIVE TYPE: ICD-10-CM

## 2022-09-29 ENCOUNTER — MYC REFILL (OUTPATIENT)
Dept: FAMILY MEDICINE | Facility: CLINIC | Age: 22
End: 2022-09-29

## 2022-09-29 DIAGNOSIS — F90.0 ATTENTION DEFICIT HYPERACTIVITY DISORDER (ADHD), PREDOMINANTLY INATTENTIVE TYPE: ICD-10-CM

## 2022-09-29 RX ORDER — METHYLPHENIDATE HYDROCHLORIDE 54 MG/1
54 TABLET ORAL EVERY MORNING
Qty: 30 TABLET | Refills: 0 | Status: CANCELLED | OUTPATIENT
Start: 2022-09-29

## 2022-09-29 RX ORDER — METHYLPHENIDATE HYDROCHLORIDE 54 MG/1
54 TABLET ORAL EVERY MORNING
Qty: 30 TABLET | Refills: 0 | Status: SHIPPED | OUTPATIENT
Start: 2022-09-29 | End: 2022-10-29

## 2022-09-30 NOTE — TELEPHONE ENCOUNTER
Duplicate request    Outpatient Medication Detail     Disp Refills Start End CASSIE   methylphenidate (CONCERTA) 54 MG CR tablet 30 tablet 0 9/29/2022  No   Sig - Route: Take 1 tablet (54 mg) by mouth every morning - Oral   Sent to pharmacy as: Methylphenidate HCl ER (OSM) 54 MG Oral Tablet Extended Release (CONCERTA)   Class: E-Prescribe   Earliest Fill Date: 9/29/2022   Order: [id number]   E-Prescribing Status: Receipt confirmed by pharmacy (9/29/2022 12:46 PM CDT)     Pharmacy    [id number] - [place], [place] - [address]     Brandie Clancy RT (R)

## 2022-10-29 ENCOUNTER — MYC REFILL (OUTPATIENT)
Dept: FAMILY MEDICINE | Facility: CLINIC | Age: 22
End: 2022-10-29

## 2022-10-29 DIAGNOSIS — F90.0 ATTENTION DEFICIT HYPERACTIVITY DISORDER (ADHD), PREDOMINANTLY INATTENTIVE TYPE: ICD-10-CM

## 2022-10-31 RX ORDER — METHYLPHENIDATE HYDROCHLORIDE 54 MG/1
54 TABLET ORAL EVERY MORNING
Qty: 30 TABLET | Refills: 0 | Status: SHIPPED | OUTPATIENT
Start: 2022-10-31 | End: 2022-12-02

## 2022-11-05 ENCOUNTER — OFFICE VISIT (OUTPATIENT)
Dept: URGENT CARE | Facility: URGENT CARE | Age: 22
End: 2022-11-05
Payer: COMMERCIAL

## 2022-11-05 VITALS
DIASTOLIC BLOOD PRESSURE: 89 MMHG | SYSTOLIC BLOOD PRESSURE: 131 MMHG | BODY MASS INDEX: 32.23 KG/M2 | HEART RATE: 89 BPM | WEIGHT: 212 LBS | OXYGEN SATURATION: 98 % | TEMPERATURE: 97.9 F

## 2022-11-05 DIAGNOSIS — J03.90 TONSILLITIS: Primary | ICD-10-CM

## 2022-11-05 DIAGNOSIS — R07.0 THROAT PAIN: ICD-10-CM

## 2022-11-05 LAB
DEPRECATED S PYO AG THROAT QL EIA: NEGATIVE
FLUAV AG SPEC QL IA: NEGATIVE
FLUBV AG SPEC QL IA: NEGATIVE
GROUP A STREP BY PCR: NOT DETECTED

## 2022-11-05 PROCEDURE — 99214 OFFICE O/P EST MOD 30 MIN: CPT | Performed by: PHYSICIAN ASSISTANT

## 2022-11-05 PROCEDURE — 87651 STREP A DNA AMP PROBE: CPT | Performed by: PHYSICIAN ASSISTANT

## 2022-11-05 PROCEDURE — 87804 INFLUENZA ASSAY W/OPTIC: CPT | Mod: 59 | Performed by: PHYSICIAN ASSISTANT

## 2022-11-05 RX ORDER — IBUPROFEN 800 MG/1
800 TABLET, FILM COATED ORAL EVERY 8 HOURS PRN
Qty: 40 TABLET | Refills: 0 | Status: SHIPPED | OUTPATIENT
Start: 2022-11-05 | End: 2023-06-12

## 2022-11-05 RX ORDER — CEFDINIR 300 MG/1
300 CAPSULE ORAL 2 TIMES DAILY
Qty: 20 CAPSULE | Refills: 0 | Status: SHIPPED | OUTPATIENT
Start: 2022-11-05 | End: 2022-11-15

## 2022-11-05 NOTE — PATIENT INSTRUCTIONS
(J03.90) Tonsillitis  (primary encounter diagnosis)  Comment:   Plan: ibuprofen (ADVIL/MOTRIN) 800 MG tablet,         cefdinir (OMNICEF) 300 MG capsule            (R07.0) Throat pain  Comment:   Plan: Streptococcus A Rapid Screen w/Reflex to PCR -         Clinic Collect, Group A Streptococcus PCR         Throat Swab, Influenza A & B Antigen - Clinic         Collect, ibuprofen (ADVIL/MOTRIN) 800 MG tablet          Rest    Follow up with primary clinic should symptoms persist or worsen.

## 2022-11-05 NOTE — PROGRESS NOTES
Patient presents with:  Neck Pain: Ongoing x yesterday -soreness in the upper back and neck   Pharyngitis    (J03.90) Tonsillitis  (primary encounter diagnosis)  Comment:   Plan: ibuprofen (ADVIL/MOTRIN) 800 MG tablet,         cefdinir (OMNICEF) 300 MG capsule            (R07.0) Throat pain  Comment:   Plan: Streptococcus A Rapid Screen w/Reflex to PCR -         Clinic Collect, Group A Streptococcus PCR         Throat Swab, Influenza A & B Antigen - Clinic         Collect, ibuprofen (ADVIL/MOTRIN) 800 MG tablet          Rest    Follow up with primary clinic should symptoms persist or worsen.        37 minutes spent on the date of the encounter doing chart review, review of test results, interpretation of tests, patient visit and documentation       SUBJECTIVE:   Landon Thompson is a 22 year old male    Day 4 of lower back then mid back then upper back and lower neck discomfort with throat pain onset 2 days ago.      Had Mono when he was in high school.      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 (Patient not taking: Reported on 10/19/2020) 30 tablet 7     Social History     Tobacco Use     Smoking status: Never Smoker     Smokeless tobacco: Never Used   Substance Use Topics     Alcohol use: Not on file     Family History   Problem Relation Age of Onset     Diabetes Mother      Diabetes Father          ROS:    10 point ROS of systems including Constitutional, Eyes, Respiratory, Cardiovascular, Gastroenterology, Genitourinary, Integumentary, Muscularskeletal, Psychiatric ,neurological were all negative except for pertinent positives noted in my HPI       OBJECTIVE:  /89   Pulse 89   Temp 97.9  F (36.6  C) (Oral)   Wt 96.2 kg (212 lb)   SpO2 98%   BMI 32.23 kg/m     Physical Exam:  GENERAL APPEARANCE: healthy, alert and no distress  EYES: EOMI,  PERRL, conjunctiva clear  HENT: ear canals and TM's normal.  Nose and mouth without ulcers, erythema or lesions  HENT:tonsils are erythematous and edematous  NECK: supple, nontender, with lymphadenopathy  RESP: lungs clear to auscultation - no rales, rhonchi or wheezes  CV: regular rates and rhythm, normal S1 S2, no murmur noted  NEURO: Normal strength and tone, sensory exam grossly normal,  normal speech and mentation  SKIN: no suspicious lesions or rashes

## 2022-11-20 ENCOUNTER — HEALTH MAINTENANCE LETTER (OUTPATIENT)
Age: 22
End: 2022-11-20

## 2022-12-02 ENCOUNTER — MYC REFILL (OUTPATIENT)
Dept: FAMILY MEDICINE | Facility: CLINIC | Age: 22
End: 2022-12-02

## 2022-12-02 DIAGNOSIS — F90.0 ATTENTION DEFICIT HYPERACTIVITY DISORDER (ADHD), PREDOMINANTLY INATTENTIVE TYPE: ICD-10-CM

## 2022-12-02 RX ORDER — METHYLPHENIDATE HYDROCHLORIDE 54 MG/1
54 TABLET ORAL EVERY MORNING
Qty: 30 TABLET | Refills: 0 | Status: SHIPPED | OUTPATIENT
Start: 2022-12-02 | End: 2022-12-29

## 2022-12-11 ENCOUNTER — OFFICE VISIT (OUTPATIENT)
Dept: URGENT CARE | Facility: URGENT CARE | Age: 22
End: 2022-12-11
Payer: COMMERCIAL

## 2022-12-11 VITALS
TEMPERATURE: 99.6 F | RESPIRATION RATE: 18 BRPM | WEIGHT: 212 LBS | BODY MASS INDEX: 32.23 KG/M2 | OXYGEN SATURATION: 98 % | DIASTOLIC BLOOD PRESSURE: 80 MMHG | SYSTOLIC BLOOD PRESSURE: 130 MMHG | HEART RATE: 107 BPM

## 2022-12-11 DIAGNOSIS — J06.9 VIRAL URI WITH COUGH: Primary | ICD-10-CM

## 2022-12-11 DIAGNOSIS — R05.1 ACUTE COUGH: ICD-10-CM

## 2022-12-11 LAB
FLUAV AG SPEC QL IA: NEGATIVE
FLUBV AG SPEC QL IA: NEGATIVE

## 2022-12-11 PROCEDURE — 87804 INFLUENZA ASSAY W/OPTIC: CPT

## 2022-12-11 PROCEDURE — U0005 INFEC AGEN DETEC AMPLI PROBE: HCPCS | Performed by: NURSE PRACTITIONER

## 2022-12-11 PROCEDURE — 99213 OFFICE O/P EST LOW 20 MIN: CPT | Performed by: NURSE PRACTITIONER

## 2022-12-11 PROCEDURE — U0003 INFECTIOUS AGENT DETECTION BY NUCLEIC ACID (DNA OR RNA); SEVERE ACUTE RESPIRATORY SYNDROME CORONAVIRUS 2 (SARS-COV-2) (CORONAVIRUS DISEASE [COVID-19]), AMPLIFIED PROBE TECHNIQUE, MAKING USE OF HIGH THROUGHPUT TECHNOLOGIES AS DESCRIBED BY CMS-2020-01-R: HCPCS | Performed by: NURSE PRACTITIONER

## 2022-12-11 NOTE — PATIENT INSTRUCTIONS
Results for orders placed or performed in visit on 12/11/22   Influenza A/B antigen     Status: Normal    Specimen: Nose; Swab   Result Value Ref Range    Influenza A antigen Negative Negative    Influenza B antigen Negative Negative    Narrative    Test results must be correlated with clinical data. If necessary, results should be confirmed by a molecular assay or viral culture.       Influenza negative  covid  swab pending.    Push fluids  Lots of handwashing.   Ibuprofen as needed for fever or pain  Delsym or dayquil/nyquil for cough as needed     Rest as able.   Will call if any other labs positive.    F/u in the clinic if symptoms persist or worsen.

## 2022-12-11 NOTE — PROGRESS NOTES
Assessment & Plan     Viral URI with cough    Acute cough  - Influenza A/B antigen  - Symptomatic COVID-19 Virus (Coronavirus) by PCR Nose     Patient Instructions     Results for orders placed or performed in visit on 12/11/22   Influenza A/B antigen     Status: Normal    Specimen: Nose; Swab   Result Value Ref Range    Influenza A antigen Negative Negative    Influenza B antigen Negative Negative    Narrative    Test results must be correlated with clinical data. If necessary, results should be confirmed by a molecular assay or viral culture.       Influenza negative  covid  swab pending.    Push fluids  Lots of handwashing.   Ibuprofen as needed for fever or pain  Delsym or dayquil/nyquil for cough as needed     Rest as able.   Will call if any other labs positive.    F/u in the clinic if symptoms persist or worsen.          Return in about 1 week (around 12/18/2022) for with regular provider if symptoms persist.    SHY [facility] U[facility] PRAVIN Navarrete is a 22 year old male who presents to clinic today for the following health issues:  Chief Complaint   Patient presents with     Cough     Pt has had a cough and congestion for 3 days      HPI    URI Adult    Onset of symptoms was 3 day(s) ago.  Course of illness is worsening.    Severity moderate  Current and Associated symptoms: stuffy nose, cough - non-productive and sore throat  Treatment measures tried include Fluids and Rest.  Predisposing factors include ill contact: School.      Review of Systems  Constitutional, HEENT, cardiovascular, pulmonary, GI, , musculoskeletal, neuro, skin, endocrine and psych systems are negative, except as otherwise noted.      Objective    /80   Pulse 107   Temp 99.6  F (37.6  C) (Tympanic)   Resp 18   Wt 96.2 kg (212 lb)   SpO2 98%   BMI 32.23 kg/m    Physical Exam   GENERAL: healthy, alert and no distress  EYES: Eyes grossly normal to inspection, PERRL and  conjunctivae and sclerae normal  HENT: ear canals and TM's normal, nose and mouth without ulcers or lesions  NECK: no adenopathy, no asymmetry, masses, or scars and thyroid normal to palpation  RESP: lungs clear to auscultation - no rales, rhonchi or wheezes  CV: regular rate and rhythm, normal S1 S2, no S3 or S4, no murmur, click or rub, no peripheral edema and peripheral pulses strong  MS: no gross musculoskeletal defects noted, no edema  SKIN: no suspicious lesions or rashes

## 2022-12-12 LAB — SARS-COV-2 RNA RESP QL NAA+PROBE: NEGATIVE

## 2022-12-18 ENCOUNTER — OFFICE VISIT (OUTPATIENT)
Dept: URGENT CARE | Facility: URGENT CARE | Age: 22
End: 2022-12-18
Payer: COMMERCIAL

## 2022-12-18 VITALS
HEART RATE: 104 BPM | OXYGEN SATURATION: 97 % | SYSTOLIC BLOOD PRESSURE: 126 MMHG | DIASTOLIC BLOOD PRESSURE: 77 MMHG | RESPIRATION RATE: 18 BRPM | BODY MASS INDEX: 32.23 KG/M2 | TEMPERATURE: 98.7 F | WEIGHT: 212 LBS

## 2022-12-18 DIAGNOSIS — H65.01 NON-RECURRENT ACUTE SEROUS OTITIS MEDIA OF RIGHT EAR: Primary | ICD-10-CM

## 2022-12-18 PROCEDURE — 99213 OFFICE O/P EST LOW 20 MIN: CPT | Performed by: NURSE PRACTITIONER

## 2022-12-18 RX ORDER — PENICILLIN V POTASSIUM 500 MG/1
500 TABLET, FILM COATED ORAL 2 TIMES DAILY
Qty: 14 TABLET | Refills: 0 | Status: SHIPPED | OUTPATIENT
Start: 2022-12-18 | End: 2022-12-25

## 2022-12-18 NOTE — PROGRESS NOTES
Assessment & Plan     Non-recurrent acute serous otitis media of right ear  - penicillin V (VEETID) 500 MG tablet  Dispense: 14 tablet; Refill: 0  pcn BID x 7 days  Push fluids  F/u if persists or worsens.     There are no Patient Instructions on file for this visit.      Return in about 1 week (around 12/25/2022) for with regular provider if symptoms persist.    Yamini Tesfaye, SHY XAVIER  [facility]ENT [facility] PRAVIN Navarrete is a 22 year old male who presents to clinic today for the following health issues:  Chief Complaint   Patient presents with     Urgent Care     Possible ear infection      HPI    URI Adult    Onset of symptoms was 10 day(s) ago.  Course of illness is worsening.    Severity moderate  Current and Associated symptoms: fever, ear pain and fatigue  Treatment measures tried include Tylenol/Ibuprofen, OTC Cough med, Fluids and Rest.  Predisposing factors include ill contact: Work.      Review of Systems  Constitutional, HEENT, cardiovascular, pulmonary, GI, , musculoskeletal, neuro, skin, endocrine and psych systems are negative, except as otherwise noted.      Objective    /77   Pulse 104   Temp 98.7  F (37.1  C)   Resp 18   Wt 96.2 kg (212 lb)   SpO2 97%   BMI 32.23 kg/m    Physical Exam   GENERAL: healthy, alert and no distress  EYES: Eyes grossly normal to inspection, PERRL and conjunctivae and sclerae normal  HENT: normal cephalic/atraumatic, both ears: erythematous, bulging membrane and mucopurulent effusion, nose and mouth without ulcers or lesions, oropharynx clear and oral mucous membranes moist  NECK: no adenopathy, no asymmetry, masses, or scars and thyroid normal to palpation  RESP: lungs clear to auscultation - no rales, rhonchi or wheezes  CV: regular rate and rhythm, normal S1 S2, no S3 or S4, no murmur, click or rub, no peripheral edema and peripheral pulses strong  MS: no gross musculoskeletal defects noted, no edema  SKIN: no suspicious  lesions or rashes

## 2022-12-29 ENCOUNTER — VIRTUAL VISIT (OUTPATIENT)
Dept: FAMILY MEDICINE | Facility: CLINIC | Age: 22
End: 2022-12-29
Payer: COMMERCIAL

## 2022-12-29 DIAGNOSIS — F33.42 MAJOR DEPRESSIVE DISORDER, RECURRENT, IN FULL REMISSION (H): ICD-10-CM

## 2022-12-29 DIAGNOSIS — F90.0 ATTENTION DEFICIT HYPERACTIVITY DISORDER (ADHD), PREDOMINANTLY INATTENTIVE TYPE: Primary | ICD-10-CM

## 2022-12-29 PROCEDURE — 99213 OFFICE O/P EST LOW 20 MIN: CPT | Mod: 95 | Performed by: INTERNAL MEDICINE

## 2022-12-29 RX ORDER — METHYLPHENIDATE HYDROCHLORIDE 54 MG/1
54 TABLET ORAL EVERY MORNING
Qty: 90 TABLET | Refills: 0 | Status: SHIPPED | OUTPATIENT
Start: 2022-12-29 | End: 2023-04-11

## 2022-12-29 ASSESSMENT — ANXIETY QUESTIONNAIRES
7. FEELING AFRAID AS IF SOMETHING AWFUL MIGHT HAPPEN: NOT AT ALL
GAD7 TOTAL SCORE: 7
2. NOT BEING ABLE TO STOP OR CONTROL WORRYING: MORE THAN HALF THE DAYS
8. IF YOU CHECKED OFF ANY PROBLEMS, HOW DIFFICULT HAVE THESE MADE IT FOR YOU TO DO YOUR WORK, TAKE CARE OF THINGS AT HOME, OR GET ALONG WITH OTHER PEOPLE?: SOMEWHAT DIFFICULT
5. BEING SO RESTLESS THAT IT IS HARD TO SIT STILL: SEVERAL DAYS
IF YOU CHECKED OFF ANY PROBLEMS ON THIS QUESTIONNAIRE, HOW DIFFICULT HAVE THESE PROBLEMS MADE IT FOR YOU TO DO YOUR WORK, TAKE CARE OF THINGS AT HOME, OR GET ALONG WITH OTHER PEOPLE: SOMEWHAT DIFFICULT
GAD7 TOTAL SCORE: 7
GAD7 TOTAL SCORE: 7
3. WORRYING TOO MUCH ABOUT DIFFERENT THINGS: SEVERAL DAYS
7. FEELING AFRAID AS IF SOMETHING AWFUL MIGHT HAPPEN: NOT AT ALL
1. FEELING NERVOUS, ANXIOUS, OR ON EDGE: SEVERAL DAYS
4. TROUBLE RELAXING: SEVERAL DAYS
6. BECOMING EASILY ANNOYED OR IRRITABLE: SEVERAL DAYS

## 2022-12-29 ASSESSMENT — PATIENT HEALTH QUESTIONNAIRE - PHQ9
SUM OF ALL RESPONSES TO PHQ QUESTIONS 1-9: 11
SUM OF ALL RESPONSES TO PHQ QUESTIONS 1-9: 11
10. IF YOU CHECKED OFF ANY PROBLEMS, HOW DIFFICULT HAVE THESE PROBLEMS MADE IT FOR YOU TO DO YOUR WORK, TAKE CARE OF THINGS AT HOME, OR GET ALONG WITH OTHER PEOPLE: SOMEWHAT DIFFICULT

## 2022-12-29 NOTE — PROGRESS NOTES
Landon is a 22 year old who is being evaluated via a billable video visit.      How would you like to obtain your AVS? MyChart  If the video visit is dropped, the invitation should be resent by: Text to cell phone: [phone]  Will anyone else be joining your video visit? No        Assessment & Plan     Attention deficit hyperactivity disorder (ADHD), predominantly inattentive type  Stable ; continue current concerta ; try to take weekend days off if possible   - methylphenidate (CONCERTA) 54 MG CR tablet; Take 1 tablet (54 mg) by mouth every morning    Major depressive disorder, recurrent, in full remission (H)  Stable; continue current prozac         22 minutes spent on the date of the encounter doing chart review, history and exam, documentation and further activities per the note        Return in about 6 months (around 6/29/2023) for Preventive Visit.  Patient instructed to return to clinic or contact us sooner if symptoms worsen or new symptoms develop.     Kirill Walker MD  [facility] Navarrete is a 22 year old, presenting for the following health issues:  Follow Up (Patient having a virtual follow up for  his depression. )      History of Present Illness       Mental Health Follow-up:  Patient presents to follow-up on Depression & Anxiety.Patient's depression since last visit has been:  Better  The patient is not having other symptoms associated with depression.  Patient's anxiety since last visit has been:  Better  The patient is not having other symptoms associated with anxiety.  Any significant life events: No  Patient is not feeling anxious or having panic attacks.  Patient has no concerns about alcohol or drug use.    He eats 0-1 servings of fruits and vegetables daily.He consumes 1 sweetened beverage(s) daily.He exercises with enough effort to increase his heart rate 10 to 19 minutes per day.  He exercises with enough effort to increase his heart rate 3 or less days per  week.   He is taking medications regularly.    Today's PHQ-9         PHQ-9 Total Score: 11    PHQ-9 Q9 Thoughts of better off dead/self-harm past 2 weeks :   Not at all    How difficult have these problems made it for you to do your work, take care of things at home, or get along with other people: Somewhat difficult  Today's WANDER-7 Score: 7         Doing better back on prozac  Has started walking for exercise too which helped mood and concentration a lot     Review of Systems         Objective           Vitals:  No vitals were obtained today due to virtual visit.    Physical Exam   GENERAL: Healthy, alert and no distress  EYES: Eyes grossly normal to inspection.  No discharge or erythema, or obvious scleral/conjunctival abnormalities.  RESP: No audible wheeze, cough, or visible cyanosis.  No visible retractions or increased work of breathing.    SKIN: Visible skin clear. No significant rash, abnormal pigmentation or lesions.  NEURO: Cranial nerves grossly intact.  Mentation and speech appropriate for age.  PSYCH: Mentation appears normal, affect normal/bright, judgement and insight intact, normal speech and appearance well-groomed.                Video-Visit Details    Type of service:  Video Visit     Originating Location (pt. Location): Home  Distant Location (provider location):  On-site  Platform used for Video Visit: Ed  [phone]

## 2023-01-06 ENCOUNTER — MYC REFILL (OUTPATIENT)
Dept: FAMILY MEDICINE | Facility: CLINIC | Age: 23
End: 2023-01-06

## 2023-01-06 DIAGNOSIS — F90.0 ATTENTION DEFICIT HYPERACTIVITY DISORDER (ADHD), PREDOMINANTLY INATTENTIVE TYPE: Primary | ICD-10-CM

## 2023-01-30 ENCOUNTER — OFFICE VISIT (OUTPATIENT)
Dept: URGENT CARE | Facility: URGENT CARE | Age: 23
End: 2023-01-30
Payer: COMMERCIAL

## 2023-01-30 VITALS
HEART RATE: 104 BPM | TEMPERATURE: 99.9 F | RESPIRATION RATE: 16 BRPM | OXYGEN SATURATION: 99 % | DIASTOLIC BLOOD PRESSURE: 80 MMHG | SYSTOLIC BLOOD PRESSURE: 122 MMHG

## 2023-01-30 DIAGNOSIS — J02.0 STREPTOCOCCAL SORE THROAT: ICD-10-CM

## 2023-01-30 DIAGNOSIS — R50.9 FEVER AND CHILLS: Primary | ICD-10-CM

## 2023-01-30 DIAGNOSIS — R51.9 NONINTRACTABLE HEADACHE, UNSPECIFIED CHRONICITY PATTERN, UNSPECIFIED HEADACHE TYPE: ICD-10-CM

## 2023-01-30 DIAGNOSIS — R09.81 NASAL CONGESTION: ICD-10-CM

## 2023-01-30 LAB
DEPRECATED S PYO AG THROAT QL EIA: NEGATIVE
FLUAV AG SPEC QL IA: NEGATIVE
FLUBV AG SPEC QL IA: NEGATIVE
GROUP A STREP BY PCR: DETECTED
SARS-COV-2 RNA RESP QL NAA+PROBE: NEGATIVE

## 2023-01-30 PROCEDURE — 87804 INFLUENZA ASSAY W/OPTIC: CPT | Performed by: PHYSICIAN ASSISTANT

## 2023-01-30 PROCEDURE — U0005 INFEC AGEN DETEC AMPLI PROBE: HCPCS | Performed by: PHYSICIAN ASSISTANT

## 2023-01-30 PROCEDURE — 99214 OFFICE O/P EST MOD 30 MIN: CPT | Mod: CS | Performed by: PHYSICIAN ASSISTANT

## 2023-01-30 PROCEDURE — U0003 INFECTIOUS AGENT DETECTION BY NUCLEIC ACID (DNA OR RNA); SEVERE ACUTE RESPIRATORY SYNDROME CORONAVIRUS 2 (SARS-COV-2) (CORONAVIRUS DISEASE [COVID-19]), AMPLIFIED PROBE TECHNIQUE, MAKING USE OF HIGH THROUGHPUT TECHNOLOGIES AS DESCRIBED BY CMS-2020-01-R: HCPCS | Performed by: PHYSICIAN ASSISTANT

## 2023-01-30 PROCEDURE — 87651 STREP A DNA AMP PROBE: CPT | Performed by: PHYSICIAN ASSISTANT

## 2023-01-30 RX ORDER — CETIRIZINE HYDROCHLORIDE, PSEUDOEPHEDRINE HYDROCHLORIDE 5; 120 MG/1; MG/1
1 TABLET, FILM COATED, EXTENDED RELEASE ORAL 2 TIMES DAILY
Qty: 28 TABLET | Refills: 0 | Status: SHIPPED | OUTPATIENT
Start: 2023-01-30

## 2023-01-30 RX ORDER — AMOXICILLIN 875 MG
875 TABLET ORAL 2 TIMES DAILY
Qty: 20 TABLET | Refills: 0 | Status: SHIPPED | OUTPATIENT
Start: 2023-01-30 | End: 2023-02-09

## 2023-01-30 RX ORDER — IBUPROFEN 800 MG/1
800 TABLET, FILM COATED ORAL EVERY 8 HOURS PRN
Qty: 30 TABLET | Refills: 0 | Status: SHIPPED | OUTPATIENT
Start: 2023-01-30 | End: 2023-06-12

## 2023-01-30 NOTE — PROGRESS NOTES
Assessment & Plan     Fever and chills    A fever is a normal reaction of your body to an illness. The temperature itself often isn t harmful. It actually helps your body fight infections. You don t need to treat a fever unless you feel very uncomfortable.   If the fever doesn t get better within 1 hour after you take acetaminophen, take ibuprofen. If this works, keep taking the ibuprofen every 6 to 8 hours.   If either medicine alone doesn t keep the fever down, you may switch off between the 2 medicines every 3 to 4 hours. For example, take ibuprofen. Wait 3 hours. Then take acetaminophen. Wait 3 hours. Take ibuprofen, and so on.    Increase oral fluids  Motrin for fever    Strep neg, culture pending  Influenza NEG  covid pending    - Streptococcus A Rapid Screen w/Reflex to PCR  - Influenza A/B antigen  - Symptomatic COVID-19 Virus (Coronavirus) by PCR Nose  - Group A Streptococcus PCR Throat Swab  - ibuprofen (ADVIL/MOTRIN) 800 MG tablet; Take 1 tablet (800 mg) by mouth every 8 hours as needed    Throat pain + Culture    You have had a positive test for strep throat. Strep throat is a contagious illness. It's spread by coughing, kissing, sharing glasses or eating utensils, or by touching others after touching your mouth or nose. Symptoms include throat pain that is worse with swallowing, aching all over, headache, swollen lymph nodes at the front of the neck, and red swollen tonsils sometimes with white patches and fever. It's treated with antibiotic medicine. This should help you start to feel better in 1 to 2 days.     Amoxicillin called into pharmacy, nurse calling to let patient know of + strep culture    - magic mouthwash (ENTER INGREDIENTS IN COMMENTS) suspension; Swish and spit 5-10 mLs in mouth every 6 hours as needed 30 ml of Benadryl (12.5 mg/5 ml), 60 ml Maalox, 30 ml Viscous Lidocaine    Nonintractable headache, unspecified chronicity pattern, unspecified headache type    No signs of  "meningitis  Headache is minimal  Motrin for headache prn  - ibuprofen (ADVIL/MOTRIN) 800 MG tablet; Take 1 tablet (800 mg) by mouth every 8 hours as needed    Nasal congestion    Patient has URI symptoms with nasal congestion  Zyrtec D for nasal congestion   - cetirizine-pseudoePHEDrine ER (ZYRTEC-D) 5-120 MG 12 hr tablet; Take 1 tablet by mouth 2 times daily    Review of external notes as documented elsewhere in note       BMI:   Estimated body mass index is 32.23 kg/m  as calculated from the following:    Height as of 6/23/22: 1.727 m (5' 8\").    Weight as of 12/18/22: 96.2 kg (212 lb).     At today's visit with Landon Thompson , we discussed results, diagnosis, medications and formulated a plan.  We also discussed red flags for immediate return to clinic/ER, as well as indications for follow up with PCP if not improved in 3 days. Patient understood and agreed to plan. Landon Thompson was discharged with stable vitals and has no further questions.       No follow-ups on file.    Damien Quiroz, [facility], PA-C  [facility] URGENT [facility]    Carlos Navarrete is a 22 year old, presenting for the following health issues:  Headache (Headache, chills, fever, stomach pains, congested X 2 days )      HPI   Review of Systems   Constitutional, HEENT, cardiovascular, pulmonary, GI, , musculoskeletal, neuro, skin, endocrine and psych systems are negative, except as otherwise noted.      Objective    /80   Pulse 104   Temp 99.9  F (37.7  C) (Tympanic)   Resp 16   SpO2 99%   There is no height or weight on file to calculate BMI.  Physical Exam   GENERAL: healthy, alert and no distress  EYES: Eyes grossly normal to inspection, PERRL and conjunctivae and sclerae normal  HENT: normal cephalic/atraumatic, ear canals and TM's normal, nose and mouth without ulcers or lesions, nasal mucosa edematous , oropharynx clear and oral mucous membranes moist  NECK: no adenopathy, no asymmetry, masses, or scars and thyroid " normal to palpation  RESP: lungs clear to auscultation - no rales, rhonchi or wheezes  CV: regular rate and rhythm, normal S1 S2, no S3 or S4, no murmur, click or rub, no peripheral edema and peripheral pulses strong  MS: no gross musculoskeletal defects noted, no edema  SKIN: no suspicious lesions or rashes  NEURO: Normal strength and tone, mentation intact and speech normal  PSYCH: mentation appears normal, affect normal/bright        Results for orders placed or performed in visit on 01/30/23   Streptococcus A Rapid Screen w/Reflex to PCR     Status: Normal    Specimen: Throat; Swab   Result Value Ref Range    Group A Strep antigen Negative Negative   Influenza A/B antigen     Status: Normal    Specimen: Nose; Swab   Result Value Ref Range    Influenza A antigen Negative Negative    Influenza B antigen Negative Negative    Narrative    Test results must be correlated with clinical data. If necessary, results should be confirmed by a molecular assay or viral culture.

## 2023-01-30 NOTE — LETTER
[facility] URGENT CARE [facility]  [address] [address]  [phone]      January 30, 2023    RE:  Landon Thompson                                                                                                                                                       [address] [place] [address]            To whom it may concern:    Landon Thompson was seen in the urgent care today for a fever.  He will miss work and be able to return to work when his fever has resolved.         Sincerely,        Damien Quiroz, [facility], PA-C    [facility]

## 2023-02-21 ENCOUNTER — E-VISIT (OUTPATIENT)
Dept: FAMILY MEDICINE | Facility: CLINIC | Age: 23
End: 2023-02-21
Payer: COMMERCIAL

## 2023-02-21 DIAGNOSIS — J02.9 ACUTE SORE THROAT: Primary | ICD-10-CM

## 2023-02-21 PROCEDURE — 99421 OL DIG E/M SVC 5-10 MIN: CPT | Performed by: INTERNAL MEDICINE

## 2023-02-22 NOTE — PATIENT INSTRUCTIONS
Thank you for choosing us for your care. Given your symptoms, I would like you to do a lab-only visit to determine what is causing them.  I have placed the orders.  Please schedule an appointment with the lab right here in [place], or call [phone].  I will let you know when the results are back and next steps to take.

## 2023-02-23 ENCOUNTER — OFFICE VISIT (OUTPATIENT)
Dept: URGENT CARE | Facility: URGENT CARE | Age: 23
End: 2023-02-23
Payer: COMMERCIAL

## 2023-02-23 ENCOUNTER — LAB (OUTPATIENT)
Dept: FAMILY MEDICINE | Facility: CLINIC | Age: 23
End: 2023-02-23
Attending: INTERNAL MEDICINE
Payer: COMMERCIAL

## 2023-02-23 VITALS
DIASTOLIC BLOOD PRESSURE: 99 MMHG | RESPIRATION RATE: 20 BRPM | TEMPERATURE: 99.5 F | OXYGEN SATURATION: 97 % | HEART RATE: 117 BPM | SYSTOLIC BLOOD PRESSURE: 147 MMHG

## 2023-02-23 DIAGNOSIS — R07.0 THROAT PAIN: Primary | ICD-10-CM

## 2023-02-23 DIAGNOSIS — J02.9 ACUTE SORE THROAT: ICD-10-CM

## 2023-02-23 DIAGNOSIS — B27.90 INFECTIOUS MONONUCLEOSIS WITHOUT COMPLICATION, INFECTIOUS MONONUCLEOSIS DUE TO UNSPECIFIED ORGANISM: ICD-10-CM

## 2023-02-23 LAB
BASOPHILS # BLD AUTO: 0.1 10E3/UL (ref 0–0.2)
BASOPHILS NFR BLD AUTO: 0 %
DEPRECATED S PYO AG THROAT QL EIA: NEGATIVE
EOSINOPHIL # BLD AUTO: 0.1 10E3/UL (ref 0–0.7)
EOSINOPHIL NFR BLD AUTO: 1 %
ERYTHROCYTE [DISTWIDTH] IN BLOOD BY AUTOMATED COUNT: 12 % (ref 10–15)
HCT VFR BLD AUTO: 44.4 % (ref 40–53)
HGB BLD-MCNC: 14.5 G/DL (ref 13.3–17.7)
IMM GRANULOCYTES # BLD: 0 10E3/UL
IMM GRANULOCYTES NFR BLD: 0 %
LYMPHOCYTES # BLD AUTO: 12 10E3/UL (ref 0.8–5.3)
LYMPHOCYTES NFR BLD AUTO: 70 %
MCH RBC QN AUTO: 28.7 PG (ref 26.5–33)
MCHC RBC AUTO-ENTMCNC: 32.7 G/DL (ref 31.5–36.5)
MCV RBC AUTO: 88 FL (ref 78–100)
MONOCYTES # BLD AUTO: 0.8 10E3/UL (ref 0–1.3)
MONOCYTES NFR BLD AUTO: 5 %
MONOCYTES NFR BLD AUTO: POSITIVE %
NEUTROPHILS # BLD AUTO: 4.1 10E3/UL (ref 1.6–8.3)
NEUTROPHILS NFR BLD AUTO: 24 %
PLATELET # BLD AUTO: 280 10E3/UL (ref 150–450)
RBC # BLD AUTO: 5.06 10E6/UL (ref 4.4–5.9)
SARS-COV-2 RNA RESP QL NAA+PROBE: NEGATIVE
WBC # BLD AUTO: 17 10E3/UL (ref 4–11)

## 2023-02-23 PROCEDURE — U0005 INFEC AGEN DETEC AMPLI PROBE: HCPCS

## 2023-02-23 PROCEDURE — 87651 STREP A DNA AMP PROBE: CPT | Performed by: PHYSICIAN ASSISTANT

## 2023-02-23 PROCEDURE — U0003 INFECTIOUS AGENT DETECTION BY NUCLEIC ACID (DNA OR RNA); SEVERE ACUTE RESPIRATORY SYNDROME CORONAVIRUS 2 (SARS-COV-2) (CORONAVIRUS DISEASE [COVID-19]), AMPLIFIED PROBE TECHNIQUE, MAKING USE OF HIGH THROUGHPUT TECHNOLOGIES AS DESCRIBED BY CMS-2020-01-R: HCPCS

## 2023-02-23 PROCEDURE — 86308 HETEROPHILE ANTIBODY SCREEN: CPT | Performed by: PHYSICIAN ASSISTANT

## 2023-02-23 PROCEDURE — 99214 OFFICE O/P EST MOD 30 MIN: CPT | Performed by: PHYSICIAN ASSISTANT

## 2023-02-23 PROCEDURE — 85025 COMPLETE CBC W/AUTO DIFF WBC: CPT | Performed by: PHYSICIAN ASSISTANT

## 2023-02-23 PROCEDURE — 36415 COLL VENOUS BLD VENIPUNCTURE: CPT | Performed by: PHYSICIAN ASSISTANT

## 2023-02-23 PROCEDURE — 99207 PR NO CHARGE NURSE ONLY: CPT

## 2023-02-23 ASSESSMENT — ENCOUNTER SYMPTOMS
VOMITING: 0
HEADACHES: 0
SORE THROAT: 1
COUGH: 0
NAUSEA: 0
ABDOMINAL PAIN: 0
SHORTNESS OF BREATH: 1
FATIGUE: 1
TROUBLE SWALLOWING: 1
FEVER: 1

## 2023-02-23 ASSESSMENT — PATIENT HEALTH QUESTIONNAIRE - PHQ9
SUM OF ALL RESPONSES TO PHQ QUESTIONS 1-9: 11
SUM OF ALL RESPONSES TO PHQ QUESTIONS 1-9: 11
10. IF YOU CHECKED OFF ANY PROBLEMS, HOW DIFFICULT HAVE THESE PROBLEMS MADE IT FOR YOU TO DO YOUR WORK, TAKE CARE OF THINGS AT HOME, OR GET ALONG WITH OTHER PEOPLE: VERY DIFFICULT

## 2023-02-23 NOTE — PROGRESS NOTES
SUBJECTIVE:   Landon Thompson is a 22 year old male presenting with a chief complaint of   Chief Complaint   Patient presents with     Pharyngitis     Fatigued also since last week, had strep Dx. on 1/30/2023, finished all of the Rx., difficult to swallow        He is an established patient of [facility].    URI Adult    Patient was recently on Amoxicillin for strep throat (1/30/23). However, patient with recurrent sore throat, making it difficult to swallow. Tested negative for COVID today. Patient reports that he did have mononucleosis in 2018.  Onset of symptoms was 1 week ago.   Course of illness is worsening.    Severity severe  Current and Associated symptoms: fever, shortness of breath, sore throat and fatigue  Treatment measures tried include Tylenol/Ibuprofen.  Predisposing factors include None.        Review of Systems   Constitutional: Positive for fatigue and fever (subjective).   HENT: Positive for sore throat and trouble swallowing (worse today).    Respiratory: Positive for shortness of breath (difficult breathing). Negative for cough.    Gastrointestinal: Negative for abdominal pain, nausea and vomiting.   Neurological: Negative for headaches.       No past medical history on file.  No family history on file.  Current Outpatient Medications   Medication Sig Dispense Refill     FLUoxetine (PROZAC) 20 MG capsule Take 20 mg by mouth daily       FLUoxetine (PROZAC) 40 MG capsule Take 1 capsule (40 mg) by mouth daily 90 capsule 3     ibuprofen (ADVIL/MOTRIN) 800 MG tablet Take 1 tablet (800 mg) by mouth every 8 hours as needed for moderate pain 40 tablet 0     methylphenidate (CONCERTA) 54 MG CR tablet Take 1 tablet (54 mg) by mouth every morning 90 tablet 0     methylphenidate (CONCERTA) 54 MG CR tablet Take 1 tablet (54 mg) by mouth every morning 30 tablet 0     Methylphenidate HCl ER 45 MG CP24 Take 1 capsule by mouth daily 90 capsule 0     cetirizine-pseudoePHEDrine ER (ZYRTEC-D) 5-120 MG 12 hr tablet  Take 1 tablet by mouth 2 times daily (Patient not taking: Reported on 2/23/2023) 28 tablet 0     ibuprofen (ADVIL/MOTRIN) 800 MG tablet Take 1 tablet (800 mg) by mouth every 8 hours as needed 30 tablet 0     magic mouthwash (ENTER INGREDIENTS IN COMMENTS) suspension Swish and spit 5-10 mLs in mouth every 6 hours as needed 30 ml of Benadryl (12.5 mg/5 ml), 60 ml Maalox, 30 ml Viscous Lidocaine 120 mL 0     Social History     Tobacco Use     Smoking status: Never     Smokeless tobacco: Never   Substance Use Topics     Alcohol use: Not Currently       OBJECTIVE  BP (!) 147/99 (BP Location: Right arm, Patient Position: Sitting, Cuff Size: Adult Large)   Pulse 117   Temp 99.5  F (37.5  C) (Tympanic)   Resp 20   SpO2 97%     Physical Exam  Vitals reviewed.   Constitutional:       General: He is not in acute distress.     Appearance: Normal appearance. He is normal weight. He is not ill-appearing or toxic-appearing.   HENT:      Head: Normocephalic and atraumatic.      Right Ear: Tympanic membrane and ear canal normal.      Left Ear: Tympanic membrane and ear canal normal.      Nose: Nose normal. No rhinorrhea.      Mouth/Throat:      Mouth: Mucous membranes are moist.      Pharynx: Uvula midline. Posterior oropharyngeal erythema present.      Tonsils: Tonsillar exudate present.   Eyes:      Conjunctiva/sclera: Conjunctivae normal.   Cardiovascular:      Rate and Rhythm: Regular rhythm. Tachycardia present.      Heart sounds: Normal heart sounds.   Pulmonary:      Effort: Pulmonary effort is normal.      Breath sounds: Normal breath sounds.   Musculoskeletal:      Cervical back: Neck supple.   Lymphadenopathy:      Cervical: No cervical adenopathy.   Skin:     General: Skin is warm and dry.   Neurological:      Mental Status: He is alert.         Labs:  Results for orders placed or performed in visit on 02/23/23 (from the past 24 hour(s))   Streptococcus A Rapid Screen w/Reflex to PCR - Clinic Collect    Specimen:  Throat; Swab   Result Value Ref Range    Group A Strep antigen Negative Negative   CBC with platelets and differential    Narrative    The following orders were created for panel order CBC with platelets and differential.  Procedure                               Abnormality         Status                     ---------                               -----------         ------                     CBC with platelets and d...[[id number]]  Abnormal            Final result                 Please view results for these tests on the individual orders.   Mononucleosis screen   Result Value Ref Range    Mononucleosis Screen Positive (A) Negative   CBC with platelets and differential   Result Value Ref Range    WBC Count 17.0 (H) 4.0 - 11.0 10e3/uL    RBC Count 5.06 4.40 - 5.90 10e6/uL    Hemoglobin 14.5 13.3 - 17.7 g/dL    Hematocrit 44.4 40.0 - 53.0 %    MCV 88 78 - 100 fL    MCH 28.7 26.5 - 33.0 pg    MCHC 32.7 31.5 - 36.5 g/dL    RDW 12.0 10.0 - 15.0 %    Platelet Count 280 150 - 450 10e3/uL    % Neutrophils 24 %    % Lymphocytes 70 %    % Monocytes 5 %    % Eosinophils 1 %    % Basophils 0 %    % Immature Granulocytes 0 %    Absolute Neutrophils 4.1 1.6 - 8.3 10e3/uL    Absolute Lymphocytes 12.0 (H) 0.8 - 5.3 10e3/uL    Absolute Monocytes 0.8 0.0 - 1.3 10e3/uL    Absolute Eosinophils 0.1 0.0 - 0.7 10e3/uL    Absolute Basophils 0.1 0.0 - 0.2 10e3/uL    Absolute Immature Granulocytes 0.0 <=0.4 10e3/uL       X-Ray was not done.    ASSESSMENT:      ICD-10-CM    1. Throat pain  R07.0 Streptococcus A Rapid Screen w/Reflex to PCR - Clinic Collect     Group A Streptococcus PCR Throat Swab     CBC with platelets and differential     Mononucleosis screen     CBC with platelets and differential     Mononucleosis screen      2. Infectious mononucleosis without complication, infectious mononucleosis due to unspecified organism  B27.90            Medical Decision Making:    Differential Diagnosis:  URI Adult/Peds:  Strep pharyngitis,  Tonsillitis, Viral pharyngitis    Serious Comorbid Conditions:  Adult:  None    PLAN:    Note for work.   Tylenol/motrin prn.  Tylenol/motrin as needed.  Drink plenty of water.  Gargle with salt water.  May use chloraseptic spray as directed for ST.  Strep pcr pending.      Followup:    If not improving or if condition worsens, follow up with your Primary Care Provider, If not improving or if conditions worsens over the next 12-24 hours, go to the Emergency Department    There are no Patient Instructions on file for this visit.

## 2023-02-23 NOTE — LETTER
[facility] [place]  [address]  [place] [address]  Phone: [phone]  Fax: [phone]    February 23, 2023        Landon Thompson  [address] [place] [address]          To whom it may concern:    RE: Landon Thompson    Patient was seen and treated today at our clinic and missed work (2/21-2/26).  Patient may return to work on 2/27/23.    Please contact me for questions or concerns.      Sincerely,        Cherise Kincaid

## 2023-02-23 NOTE — RESULT ENCOUNTER NOTE
The following letter pertains to your most recent diagnostic tests:    Good news! This is a negative COVID test result        Sincerely,    Dr. Walker

## 2023-02-24 LAB — GROUP A STREP BY PCR: NOT DETECTED

## 2023-02-24 NOTE — RESULT ENCOUNTER NOTE
The following letter pertains to your most recent diagnostic tests:    The mono screening test is positive.   This likely explains your sore throat and feeling poorly.  There is specific treatment for mono.  It often takes several weeks to recover completely from mono.        Sincerely,    Dr. Walker

## 2023-03-08 ENCOUNTER — OFFICE VISIT (OUTPATIENT)
Dept: URGENT CARE | Facility: URGENT CARE | Age: 23
End: 2023-03-08
Payer: COMMERCIAL

## 2023-03-08 VITALS
HEART RATE: 87 BPM | DIASTOLIC BLOOD PRESSURE: 74 MMHG | TEMPERATURE: 99 F | RESPIRATION RATE: 16 BRPM | SYSTOLIC BLOOD PRESSURE: 120 MMHG

## 2023-03-08 DIAGNOSIS — H10.9 BACTERIAL CONJUNCTIVITIS: Primary | ICD-10-CM

## 2023-03-08 PROCEDURE — 99213 OFFICE O/P EST LOW 20 MIN: CPT

## 2023-03-08 RX ORDER — POLYMYXIN B SULFATE AND TRIMETHOPRIM 1; 10000 MG/ML; [USP'U]/ML
2 SOLUTION OPHTHALMIC EVERY 6 HOURS
Qty: 2.75 ML | Refills: 0 | Status: SHIPPED | OUTPATIENT
Start: 2023-03-08 | End: 2023-03-15

## 2023-03-08 NOTE — PROGRESS NOTES
ASSESSMENT:  (H10.9) Bacterial conjunctivitis  (primary encounter diagnosis)  Plan: trimethoprim-polymyxin b (POLYTRIM) 59568-8.1         UNIT/ML-% ophthalmic solution    PLAN:  Conjunctivitis, bacterial patient instructions discussed and provided.  Informed the patient to use the eyedrops as prescribed and finish the full course even if symptoms improve.  Work note provided.  Discussed the need to return to clinic with any new or worsening symptoms.  Patient acknowledged his understanding of the above plan.    The use of Dragon/Power[employer] dictation services may have been used to construct the content in this note; any grammatical or spelling errors are non-intentional. Please contact the author of this note directly if you are in need of any clarification.      SHY Hewitt CNP    SUBJECTIVE:  Landon Thompson is a 22 year old male who presents complaining of clouding sensation in the right eye, increased tears and discharge eye discomfort and watering for 3 day(s).   Details:  Associated Signs and Syptoms: none  Treatment measures tried include: none  Contact wearer : No  The patient also reports coming in contact with someone he works with that has bacterial conjunctivitis.      ROS: negative except noted above      OBJECTIVE:  /74 (BP Location: Right arm, Patient Position: Sitting, Cuff Size: Adult Large)   Pulse 87   Temp 99  F (37.2  C) (Tympanic)   Resp 16   General: no acute distress  Eye exam: right eye: lids normal; PERRL, EOM's intact; mild conjunctival injection, mattering on the lateral aspect of the lower eyelid, increased tearing noted compared to the left

## 2023-03-08 NOTE — LETTER
March 8, 2023      Landon VIKI Jay  [address] [place] [address]        To Whom It May Concern:    Landon M Jay  was seen on March 8, 2023.  Please excuse him from work until March 10th due to illness.        Sincerely,        SHY Hewitt CNP

## 2023-03-19 ENCOUNTER — E-VISIT (OUTPATIENT)
Dept: FAMILY MEDICINE | Facility: CLINIC | Age: 23
End: 2023-03-19
Payer: COMMERCIAL

## 2023-03-19 DIAGNOSIS — R05.9 COUGH, UNSPECIFIED TYPE: Primary | ICD-10-CM

## 2023-03-19 PROCEDURE — 99207 PR NON-BILLABLE SERV PER CHARTING: CPT | Performed by: INTERNAL MEDICINE

## 2023-03-21 ENCOUNTER — TELEPHONE (OUTPATIENT)
Dept: FAMILY MEDICINE | Facility: CLINIC | Age: 23
End: 2023-03-21
Payer: COMMERCIAL

## 2023-03-21 NOTE — TELEPHONE ENCOUNTER
E-visit was sent to triage, provider advises OV instead     Patient Contact    Attempt # 1    Was call answered?  No.  Left message on voicemail with information to call back. Also sent response to patient in e-visit [facility] message     Rosita MORALES, Triage RN  [facility]         [place], Jose Gomez MD  Cs Triage Im 21 hours ago (12:57 PM)     BI  Recommend team office visit         Note             Mychart E-Visit Uri Concerns    Question 3/19/2023 12:27 PM CDT - Filed by Patient   When did your symptoms first start?  3-4 days ago   Do you have a fever?  I feel feverish but cannot check my temperature   Do you have any of the following (choose all that apply)?  Cough    Sore Throat    Runny Nose (Congestion)    Achiness (sore body or muscles)    Feeling very run down   How often do you cough? Several times an hour   Is your cough producing mucus (phlegm)? Yes, not sure about the color of the mucus   Is your cough worse at night? Yes   What color is your congestion (mucus)? Green or Yellow   Have you had similar symptoms in the past? Yes, I have had similar symptoms before   Describe when you last had symptoms and what treatments have helped or not helped you in the past. With strep throat less than a month ago I believe, as well I have had Mono for over a month, but that has slowed down nearly completely   In the 14 days before your symptoms started, did you have close contact with someone who had confirmed COVID-19 (Coronavirus)? No   Have you been exposed to someone with influenza ('the flu') or strep throat?  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 No   Do you live or work in a group living setting such as a dorm, nursing home, assisted living, group home, shelter, halfway or other group housing?  No   Is there any additional information you would like  the provider to know?  Diagonosed with Mononucleosis in February. Partner who has had Mono in past did not get it from me during this time, and Now has the same symptoms I have now, but with higher assusumed fever            Mychart-Sore Throat Further Symptoms    Question 3/19/2023 12:27 PM CDT - Filed by Patient   How Severe is your pain on a scale of 1-10 with 0 being no pain and 10 being the worst pain you can imagine?  4-6 (Medium Pain Level)   Are there white spots in the back of your throat?  No   Are you having difficulty swallowing?  I can swallow normally or with mild pain

## 2023-03-22 NOTE — TELEPHONE ENCOUNTER
Upon chart review, patient read message sent via E-visit but patient has not scheduled an office visit.     Patient Contact    Attempt # 2    Was call answered?  No.  Left message on voicemail with information to call me back.    Upon call back: please triage patient's symptoms and assist in getting the patient scheduled for an office visit.     Loida Philip RN

## 2023-03-23 NOTE — TELEPHONE ENCOUNTER
Patient Contact    Attempt # 3    Was call answered? No.    Left message for patient to call triage back.    Adelina Del Castillo RN     [facility]

## 2023-03-25 ENCOUNTER — OFFICE VISIT (OUTPATIENT)
Dept: URGENT CARE | Facility: URGENT CARE | Age: 23
End: 2023-03-25
Payer: COMMERCIAL

## 2023-03-25 VITALS
BODY MASS INDEX: 32.69 KG/M2 | WEIGHT: 215 LBS | OXYGEN SATURATION: 98 % | SYSTOLIC BLOOD PRESSURE: 117 MMHG | TEMPERATURE: 98.3 F | RESPIRATION RATE: 16 BRPM | HEART RATE: 77 BPM | DIASTOLIC BLOOD PRESSURE: 77 MMHG

## 2023-03-25 DIAGNOSIS — R07.0 THROAT PAIN: ICD-10-CM

## 2023-03-25 DIAGNOSIS — J01.00 ACUTE NON-RECURRENT MAXILLARY SINUSITIS: Primary | ICD-10-CM

## 2023-03-25 DIAGNOSIS — R50.9 FEVER, UNSPECIFIED: ICD-10-CM

## 2023-03-25 PROCEDURE — 87804 INFLUENZA ASSAY W/OPTIC: CPT

## 2023-03-25 PROCEDURE — 99213 OFFICE O/P EST LOW 20 MIN: CPT | Performed by: NURSE PRACTITIONER

## 2023-03-25 PROCEDURE — 87651 STREP A DNA AMP PROBE: CPT | Performed by: NURSE PRACTITIONER

## 2023-03-25 NOTE — LETTER
March 25, 2023      Landon Thompson  [address] [place] [address]        To Whom It May Concern:    Landon M Jay  was seen on 3/25/2023.  Please excuse him  until 3/27/2023 due to illness.        Sincerely,        Inés Zamudio, CNP

## 2023-03-25 NOTE — PROGRESS NOTES
Chief Complaint   Patient presents with     Urgent Care     Coughing, fever for the last 2 weeks, covid tested yesterday and result is negative, patient need a work note for yesterday since he was sick          ICD-10-CM    1. Acute non-recurrent maxillary sinusitis  J01.00 amoxicillin-clavulanate (AUGMENTIN) 875-125 MG tablet      2. Fever, unspecified  R50.9 Influenza A/B antigen      3. Throat pain  R07.0 Streptococcus A Rapid Screen w/Reflex to PCR - Clinic Collect     Group A Streptococcus PCR Throat Swab        Antibiotics, fluids, rest, patient with distilled water only, pseudoephedrine.      Results for orders placed or performed in visit on 03/25/23 (from the past 24 hour(s))   Streptococcus A Rapid Screen w/Reflex to PCR - Clinic Collect    Specimen: Throat; Swab   Result Value Ref Range    Group A Strep antigen Negative Negative   Influenza A/B antigen    Specimen: Nasopharyngeal; Swab   Result Value Ref Range    Influenza A antigen Negative Negative    Influenza B antigen Negative Negative    Narrative    Test results must be correlated with clinical data. If necessary, results should be confirmed by a molecular assay or viral culture.       Subjective     Landon Thompson is an 22 year old male who presents to clinic today for cough for 2weeks. Mostly productive. Fevers to 101 yesterday.  Headaches, body aches, some nausea.    Taking Dayquil and Advil as needed.      ROS: 10 point ROS neg other than the symptoms noted above in the HPI.       Objective    /77   Pulse 77   Temp 98.3  F (36.8  C)   Resp 16   Wt 97.5 kg (215 lb)   SpO2 98%   BMI 32.69 kg/m    Nurses notes and VS have been reviewed.    Physical Exam       GENERAL APPEARANCE: alert and mild distress     EYES: PERRL, EOMI, sclera non-icteric     HENT: Bilateral ear canals appear normal, tympanic membranes have fluid levels present but no erythema, tender maxillary sinuses to palpation, yellow drainage in nasal passages which are  inflamed     NECK: no adenopathy or asymmetry, thyroid normal to palpation     RESP: lungs clear to auscultation - no rales, rhonchi or wheezes     CV: regular rates and rhythm, no murmurs, rubs, or gallop     MS: extremities normal- no gross deformities noted; normal muscle tone.     SKIN: no suspicious lesions or rashes     PSYCH: normal thought process; no significant mood disturbance      SHY Mcmanus, CNP  [facility]    The use of Dragon/[employer] dictation services may have been used to construct the content in this note; any grammatical or spelling errors are non-intentional. Please contact the author of this note directly if you are in need of any clarification.    none

## 2023-04-11 ENCOUNTER — MYC REFILL (OUTPATIENT)
Dept: FAMILY MEDICINE | Facility: CLINIC | Age: 23
End: 2023-04-11
Payer: COMMERCIAL

## 2023-04-11 DIAGNOSIS — F90.0 ATTENTION DEFICIT HYPERACTIVITY DISORDER (ADHD), PREDOMINANTLY INATTENTIVE TYPE: ICD-10-CM

## 2023-04-11 RX ORDER — METHYLPHENIDATE HYDROCHLORIDE 54 MG/1
54 TABLET ORAL EVERY MORNING
Qty: 90 TABLET | Refills: 0 | Status: SHIPPED | OUTPATIENT
Start: 2023-04-11 | End: 2023-07-13

## 2023-04-13 ENCOUNTER — MYC REFILL (OUTPATIENT)
Dept: FAMILY MEDICINE | Facility: CLINIC | Age: 23
End: 2023-04-13
Payer: COMMERCIAL

## 2023-04-13 DIAGNOSIS — F90.0 ATTENTION DEFICIT HYPERACTIVITY DISORDER (ADHD), PREDOMINANTLY INATTENTIVE TYPE: ICD-10-CM

## 2023-04-14 RX ORDER — METHYLPHENIDATE HYDROCHLORIDE 54 MG/1
54 TABLET ORAL EVERY MORNING
Qty: 90 TABLET | Refills: 0 | OUTPATIENT
Start: 2023-04-14

## 2023-04-17 ENCOUNTER — MYC REFILL (OUTPATIENT)
Dept: FAMILY MEDICINE | Facility: CLINIC | Age: 23
End: 2023-04-17
Payer: COMMERCIAL

## 2023-04-17 DIAGNOSIS — F90.0 ATTENTION DEFICIT HYPERACTIVITY DISORDER (ADHD), PREDOMINANTLY INATTENTIVE TYPE: ICD-10-CM

## 2023-04-17 RX ORDER — METHYLPHENIDATE HYDROCHLORIDE 54 MG/1
54 TABLET ORAL EVERY MORNING
Qty: 90 TABLET | Refills: 0 | OUTPATIENT
Start: 2023-04-17

## 2023-04-29 ENCOUNTER — OFFICE VISIT (OUTPATIENT)
Dept: URGENT CARE | Facility: URGENT CARE | Age: 23
End: 2023-04-29
Payer: COMMERCIAL

## 2023-04-29 VITALS
OXYGEN SATURATION: 97 % | DIASTOLIC BLOOD PRESSURE: 78 MMHG | HEART RATE: 101 BPM | TEMPERATURE: 98.2 F | BODY MASS INDEX: 32.08 KG/M2 | SYSTOLIC BLOOD PRESSURE: 129 MMHG | WEIGHT: 211 LBS

## 2023-04-29 DIAGNOSIS — M53.3 COCCYDYNIA: Primary | ICD-10-CM

## 2023-04-29 PROCEDURE — 99213 OFFICE O/P EST LOW 20 MIN: CPT | Performed by: PHYSICIAN ASSISTANT

## 2023-04-29 RX ORDER — IBUPROFEN 800 MG/1
800 TABLET, FILM COATED ORAL EVERY 8 HOURS PRN
Qty: 30 TABLET | Refills: 0 | Status: SHIPPED | OUTPATIENT
Start: 2023-04-29 | End: 2023-06-12

## 2023-04-29 ASSESSMENT — ENCOUNTER SYMPTOMS
FEVER: 0
RECTAL PAIN: 1
BLOOD IN STOOL: 0

## 2023-04-29 NOTE — PROGRESS NOTES
SUBJECTIVE:   Landon Thompson is a 22 year old male presenting with a chief complaint of   Chief Complaint   Patient presents with     Urgent Care     Rectal Problem     Rectal pain when sits down, lower back pain for 1 1/2 days.       He is an established patient of [facility].  Patient presents with perirectal/low back pain x 1.5 days.  Patient denies fevers, drainage.  No hx of hemorrhoids.      Treatment:  Nothing     Review of Systems   Constitutional: Negative for fever.   Gastrointestinal: Positive for rectal pain. Negative for blood in stool.   All other systems reviewed and are negative.      History reviewed. No pertinent past medical history.  History reviewed. No pertinent family history.  Current Outpatient Medications   Medication Sig Dispense Refill     FLUoxetine (PROZAC) 20 MG capsule Take 20 mg by mouth daily       methylphenidate (CONCERTA) 54 MG CR tablet Take 1 tablet (54 mg) by mouth every morning 30 tablet 0     amoxicillin-clavulanate (AUGMENTIN) 875-125 MG tablet Take 1 tablet by mouth 2 times daily (Patient not taking: Reported on 4/29/2023) 20 tablet 0     cetirizine-pseudoePHEDrine ER (ZYRTEC-D) 5-120 MG 12 hr tablet Take 1 tablet by mouth 2 times daily (Patient not taking: Reported on 4/29/2023) 28 tablet 0     FLUoxetine (PROZAC) 40 MG capsule Take 1 capsule (40 mg) by mouth daily (Patient not taking: Reported on 4/29/2023) 90 capsule 3     ibuprofen (ADVIL/MOTRIN) 800 MG tablet Take 1 tablet (800 mg) by mouth every 8 hours as needed (Patient not taking: Reported on 4/29/2023) 30 tablet 0     ibuprofen (ADVIL/MOTRIN) 800 MG tablet Take 1 tablet (800 mg) by mouth every 8 hours as needed for moderate pain (Patient not taking: Reported on 4/29/2023) 40 tablet 0     magic mouthwash (ENTER INGREDIENTS IN COMMENTS) suspension Swish and spit 5-10 mLs in mouth every 6 hours as needed 30 ml of Benadryl (12.5 mg/5 ml), 60 ml Maalox, 30 ml Viscous Lidocaine (Patient not taking: Reported on 4/29/2023)  120 mL 0     methylphenidate (CONCERTA) 54 MG CR tablet Take 1 tablet (54 mg) by mouth every morning 90 tablet 0     Methylphenidate HCl ER 45 MG CP24 Take 1 capsule by mouth daily (Patient not taking: Reported on 4/29/2023) 90 capsule 0     Social History     Tobacco Use     Smoking status: Never     Smokeless tobacco: Never   Vaping Use     Vaping status: Not on file   Substance Use Topics     Alcohol use: Not Currently       OBJECTIVE  /78   Pulse 101   Temp 98.2  F (36.8  C) (Tympanic)   Wt 95.7 kg (211 lb)   SpO2 97%   BMI 32.08 kg/m      Physical Exam  Vitals and nursing note reviewed.   Constitutional:       General: He is not in acute distress.     Appearance: Normal appearance. He is obese. He is not ill-appearing.   Cardiovascular:      Rate and Rhythm: Normal rate and regular rhythm.      Pulses: Normal pulses.      Heart sounds: Normal heart sounds.   Pulmonary:      Effort: Pulmonary effort is normal.      Breath sounds: Normal breath sounds.   Skin:     Comments: Skin normal.  Minor tenderness to palpation of coccyx area.  No abscesses palpated.  No erythema.  No hemorrhoids   Neurological:      Mental Status: He is alert.         Labs:  No results found for this or any previous visit (from the past 24 hour(s)).    X-Ray was not done.    ASSESSMENT:    No diagnosis found.     Medical Decision Making:    Differential Diagnosis:  Early pilonidal cysts?    Serious Comorbid Conditions:  Adult:  reviewed    PLAN:    Rx for ibuprofen.  Warm soaks.  Discussed reasons to seek immediate medical attention.        Followup:    If not improving or if condition worsens, follow up with your Primary Care Provider, If not improving or if conditions worsens over the next 12-24 hours, go to the Emergency Department    There are no Patient Instructions on file for this visit.

## 2023-06-12 ENCOUNTER — OFFICE VISIT (OUTPATIENT)
Dept: URGENT CARE | Facility: URGENT CARE | Age: 23
End: 2023-06-12
Payer: COMMERCIAL

## 2023-06-12 VITALS
WEIGHT: 206 LBS | BODY MASS INDEX: 31.32 KG/M2 | OXYGEN SATURATION: 96 % | SYSTOLIC BLOOD PRESSURE: 111 MMHG | DIASTOLIC BLOOD PRESSURE: 68 MMHG | HEART RATE: 88 BPM | TEMPERATURE: 98.4 F

## 2023-06-12 DIAGNOSIS — L30.4 INTERTRIGO: ICD-10-CM

## 2023-06-12 DIAGNOSIS — J02.9 SORE THROAT: Primary | ICD-10-CM

## 2023-06-12 LAB
DEPRECATED S PYO AG THROAT QL EIA: NEGATIVE
GROUP A STREP BY PCR: NOT DETECTED

## 2023-06-12 PROCEDURE — 87651 STREP A DNA AMP PROBE: CPT

## 2023-06-12 PROCEDURE — 99213 OFFICE O/P EST LOW 20 MIN: CPT

## 2023-06-12 RX ORDER — NYSTATIN 100000 U/G
CREAM TOPICAL 2 TIMES DAILY
Qty: 30 G | Refills: 1 | Status: SHIPPED | OUTPATIENT
Start: 2023-06-12 | End: 2023-11-29

## 2023-06-12 NOTE — PROGRESS NOTES
There are no exam notes on file for this visit.  Nursing note reviewed with patient.  Accurracy and completeness verified.    Chief Complaint   Patient presents with     Tailbone Pain     Possible cyst present, was seen recently for this in UC      Pharyngitis     Had strep exposure        HPI:  Landon Thompson is a 22 year old male who has a rash.  Was seen here before for coxodynia  They said to watch out for pinonidal cyst    Sitting on it and walking hurts  Worse with a lot of activity      Incidental Strep exposure  Testing today    ROS: As per HPI.  Constitutional: no recent illness, no fevers/sweats/chills      Allergies, reviewed:     Allergies   Allergen Reactions     No Known Allergies       Med list prior to vist:  FLUoxetine (PROZAC) 20 MG capsule, Take 20 mg by mouth daily  methylphenidate (CONCERTA) 54 MG CR tablet, Take 1 tablet (54 mg) by mouth every morning  methylphenidate (CONCERTA) 54 MG CR tablet, Take 1 tablet (54 mg) by mouth every morning  amoxicillin-clavulanate (AUGMENTIN) 875-125 MG tablet, Take 1 tablet by mouth 2 times daily (Patient not taking: Reported on 4/29/2023)  cetirizine-pseudoePHEDrine ER (ZYRTEC-D) 5-120 MG 12 hr tablet, Take 1 tablet by mouth 2 times daily (Patient not taking: Reported on 4/29/2023)  FLUoxetine (PROZAC) 40 MG capsule, Take 1 capsule (40 mg) by mouth daily (Patient not taking: Reported on 4/29/2023)  ibuprofen (ADVIL/MOTRIN) 800 MG tablet, Take 1 tablet (800 mg) by mouth every 8 hours as needed for moderate pain (Patient not taking: Reported on 6/12/2023)  ibuprofen (ADVIL/MOTRIN) 800 MG tablet, Take 1 tablet (800 mg) by mouth every 8 hours as needed (Patient not taking: Reported on 4/29/2023)  ibuprofen (ADVIL/MOTRIN) 800 MG tablet, Take 1 tablet (800 mg) by mouth every 8 hours as needed for moderate pain (Patient not taking: Reported on 4/29/2023)  magic mouthwash (ENTER INGREDIENTS IN COMMENTS) suspension, Swish and spit 5-10 mLs in mouth every 6 hours as  needed 30 ml of Benadryl (12.5 mg/5 ml), 60 ml Maalox, 30 ml Viscous Lidocaine (Patient not taking: Reported on 4/29/2023)  Methylphenidate HCl ER 45 MG CP24, Take 1 capsule by mouth daily (Patient not taking: Reported on 4/29/2023)    No current facility-administered medications on file prior to visit.    Medications reviewed and updated    Objective:  /68 (BP Location: Right arm, Patient Position: Sitting, Cuff Size: Adult Large)   Pulse 88   Temp 98.4  F (36.9  C) (Tympanic)   Wt 93.4 kg (206 lb)   SpO2 96%   BMI 31.32 kg/m    General Appearance: Pleasant, alert, WN/WD in no acute respiratory distress.  Skin: gluteal cleft mild tenderness and erythema, no definite cyst or abcess    Results for orders placed or performed in visit on 06/12/23   Streptococcus A Rapid Screen w/Reflex to PCR - Clinic Collect     Status: Normal    Specimen: Throat; Swab   Result Value Ref Range    Group A Strep antigen Negative Negative         ASSESSMENT/PLAN:    ICD-10-CM    1. Sore throat  J02.9 Streptococcus A Rapid Screen w/Reflex to PCR - Clinic Collect     Group A Streptococcus PCR Throat Swab      2. Intertrigo  L30.4 nystatin (MYCOSTATIN) 387125 UNIT/GM external cream         Could be the start of a gluteal cleft cyst  But nothing obvious on palpation  Not infected  But does have some erythema along the ceft, perhaps chafing/mositure  Treat with nystatin    Follow up: Return as needed if symptoms fail to improve    Has a checkup planned in 1 month      Ramsey Brice MD MPH

## 2023-06-12 NOTE — LETTER
June 12, 2023      Landon DREW Jay  [address] [place] [address]        To Whom It May Concern:    Landon Thompson  was seen on 6/12/2023.  Please excuse him  for a doctor appointment today.        Sincerely,      Ramsey Brice MD

## 2023-06-27 ENCOUNTER — E-VISIT (OUTPATIENT)
Dept: FAMILY MEDICINE | Facility: CLINIC | Age: 23
End: 2023-06-27
Payer: COMMERCIAL

## 2023-06-27 DIAGNOSIS — K62.89 ANAL OR RECTAL PAIN: Primary | ICD-10-CM

## 2023-06-27 PROCEDURE — 99421 OL DIG E/M SVC 5-10 MIN: CPT | Performed by: INTERNAL MEDICINE

## 2023-06-28 NOTE — TELEPHONE ENCOUNTER
Can you call him and tell him that his complaint is not amenable to e. Visit and help his schedule an appointment with me or team provider if he desires to be seen in primary care for this.  As per my Mychart message to him, colon and rectal surgery is another option.      Provider E-Visit time total (minutes): 5

## 2023-07-12 ENCOUNTER — OFFICE VISIT (OUTPATIENT)
Dept: FAMILY MEDICINE | Facility: CLINIC | Age: 23
End: 2023-07-12
Payer: COMMERCIAL

## 2023-07-12 ENCOUNTER — ANCILLARY PROCEDURE (OUTPATIENT)
Dept: GENERAL RADIOLOGY | Facility: CLINIC | Age: 23
End: 2023-07-12
Attending: INTERNAL MEDICINE
Payer: COMMERCIAL

## 2023-07-12 VITALS
SYSTOLIC BLOOD PRESSURE: 114 MMHG | TEMPERATURE: 97.6 F | HEART RATE: 89 BPM | DIASTOLIC BLOOD PRESSURE: 73 MMHG | RESPIRATION RATE: 18 BRPM | WEIGHT: 211.4 LBS | OXYGEN SATURATION: 97 % | BODY MASS INDEX: 32.14 KG/M2

## 2023-07-12 DIAGNOSIS — M53.3 COCCYDYNIA: Primary | ICD-10-CM

## 2023-07-12 DIAGNOSIS — M53.3 COCCYDYNIA: ICD-10-CM

## 2023-07-12 PROCEDURE — 99213 OFFICE O/P EST LOW 20 MIN: CPT | Performed by: INTERNAL MEDICINE

## 2023-07-12 PROCEDURE — 72220 X-RAY EXAM SACRUM TAILBONE: CPT | Mod: TC | Performed by: RADIOLOGY

## 2023-07-12 ASSESSMENT — PAIN SCALES - GENERAL: PAINLEVEL: MODERATE PAIN (4)

## 2023-07-12 ASSESSMENT — PATIENT HEALTH QUESTIONNAIRE - PHQ9
SUM OF ALL RESPONSES TO PHQ QUESTIONS 1-9: 11
10. IF YOU CHECKED OFF ANY PROBLEMS, HOW DIFFICULT HAVE THESE PROBLEMS MADE IT FOR YOU TO DO YOUR WORK, TAKE CARE OF THINGS AT HOME, OR GET ALONG WITH OTHER PEOPLE: SOMEWHAT DIFFICULT
SUM OF ALL RESPONSES TO PHQ QUESTIONS 1-9: 11

## 2023-07-12 NOTE — PROGRESS NOTES
Assessment & Plan     Coccydynia  Check x-ray  If no fracture, then trial of PT to address pelvic floor muscles that anchor on coccyx   He is enthusiastic to try   Recommended follow up in 4 weeks if that does not address symptom or sooner if symptoms worsen or new symptoms develop; otherwise follow up in November for routine ADHD follow up   - Physical Therapy Referral; Future  - XR Sacrum and Coccyx 2 Views; Future      26 minutes spent by me on the date of the encounter doing chart review, history and exam, documentation and further activities per the note           Kirill Walker MD  [facility] [place] is a 22 year old, presenting for the following health issues:  Musculoskeletal Problem (Patient here for weird pain on or near his tail bone x 3 months.)         No data to display              History of Present Illness       Back Pain:  He presents for follow up of back pain. Patient's back pain is a chronic problem.  Location of back pain:  Other  Description of back pain: dull ache  Back pain spreads: nowhere    Since patient first noticed back pain, pain is: always present, but gets better and worse  Does back pain interfere with his job:  Not applicable      Reason for visit:  Tailbone/Buttcrack pain when sitting  Symptom onset:  More than a month  Symptoms include:  Discomfort when sitting down, tailbone/buttcrack discomfort when sitting, lasted multiple months  Symptom intensity:  Mild  Symptom progression:  Staying the same  Had these symptoms before:  No  What makes it worse:  Sitting down on flat hard surfaces  What makes it better:  Laying on my stomach    He eats 2-3 servings of fruits and vegetables daily.He consumes 1 sweetened beverage(s) daily.He exercises with enough effort to increase his heart rate 30 to 60 minutes per day.  He exercises with enough effort to increase his heart rate 4 days per week.   He is taking medications regularly.    Today's PHQ-9          PHQ-9 Total Score: 11    PHQ-9 Q9 Thoughts of better off dead/self-harm past 2 weeks :   Not at all    How difficult have these problems made it for you to do your work, take care of things at home, or get along with other people: Somewhat difficult     He wished to discuss tailbone pain today   He has been having intermittent pain since April  No injury or trauma  Ibuprofen helps  Nystatin did not help  No fevers or drainage to suggest abscess or cyst   No anal receptive intercourse  No pain with defecation  No persistent blood in stools  Pain does not radiate to legs or cause leg numbness or weakness        Review of Systems         Objective    /73 (BP Location: Left arm, Patient Position: Sitting, Cuff Size: Adult Large)   Pulse 89   Temp 97.6  F (36.4  C) (Temporal)   Resp 18   Wt 95.9 kg (211 lb 6.4 oz)   SpO2 97%   BMI 32.14 kg/m    Body mass index is 32.14 kg/m .  Physical Exam   GENERAL: healthy, alert and no distress  MS: BACK:  No tenderness to palpation over the spinous processes of the thoracic and lumbar spine, no pain with palpation over midline coccyx, deep tendon reflexes are 2+ at the bilateral patella and Achilles tendons, there is 5 out of 5 muscle strength in all lower extremity muscle groups, there is normal sensation to light touch in all lower extremity dermatomes, straight leg raise does not elicit radicular symptoms bilaterally, Gait is normal.   SKIN: No obvious gluteal cleft rash or skin abnormality  NEURO: Normal strength and tone, mentation intact and speech normal  PSYCH: mentation appears normal, affect normal/bright

## 2023-07-12 NOTE — RESULT ENCOUNTER NOTE
The following letter pertains to your most recent diagnostic tests:    Good news! The bones of the lower back look OK.  I would proceed with physical therapy as we discussed.          Sincerely,    Dr. Walker

## 2023-07-13 ENCOUNTER — MYC REFILL (OUTPATIENT)
Dept: FAMILY MEDICINE | Facility: CLINIC | Age: 23
End: 2023-07-13
Payer: COMMERCIAL

## 2023-07-13 DIAGNOSIS — F90.0 ATTENTION DEFICIT HYPERACTIVITY DISORDER (ADHD), PREDOMINANTLY INATTENTIVE TYPE: ICD-10-CM

## 2023-07-13 RX ORDER — METHYLPHENIDATE HYDROCHLORIDE 54 MG/1
54 TABLET ORAL EVERY MORNING
Qty: 90 TABLET | Refills: 0 | Status: SHIPPED | OUTPATIENT
Start: 2023-07-13 | End: 2023-10-25

## 2023-08-05 ENCOUNTER — OFFICE VISIT (OUTPATIENT)
Dept: URGENT CARE | Facility: URGENT CARE | Age: 23
End: 2023-08-05
Payer: COMMERCIAL

## 2023-08-05 VITALS
TEMPERATURE: 98.5 F | WEIGHT: 205 LBS | HEART RATE: 103 BPM | SYSTOLIC BLOOD PRESSURE: 119 MMHG | DIASTOLIC BLOOD PRESSURE: 77 MMHG | BODY MASS INDEX: 31.17 KG/M2 | OXYGEN SATURATION: 96 %

## 2023-08-05 DIAGNOSIS — L01.00 IMPETIGO: ICD-10-CM

## 2023-08-05 DIAGNOSIS — H10.31 ACUTE CONJUNCTIVITIS OF RIGHT EYE, UNSPECIFIED ACUTE CONJUNCTIVITIS TYPE: Primary | ICD-10-CM

## 2023-08-05 PROCEDURE — 99213 OFFICE O/P EST LOW 20 MIN: CPT | Performed by: PHYSICIAN ASSISTANT

## 2023-08-05 RX ORDER — OLOPATADINE HYDROCHLORIDE 1 MG/ML
1 SOLUTION/ DROPS OPHTHALMIC 2 TIMES DAILY
Qty: 5 ML | Refills: 0 | Status: SHIPPED | OUTPATIENT
Start: 2023-08-05 | End: 2023-11-29

## 2023-08-05 RX ORDER — MUPIROCIN 20 MG/G
OINTMENT TOPICAL 3 TIMES DAILY
Qty: 15 G | Refills: 0 | Status: SHIPPED | OUTPATIENT
Start: 2023-08-05 | End: 2023-11-29

## 2023-08-05 ASSESSMENT — ENCOUNTER SYMPTOMS
PHOTOPHOBIA: 0
CARDIOVASCULAR NEGATIVE: 1
CHILLS: 0
FEVER: 0
SORE THROAT: 0
EYE REDNESS: 1
RESPIRATORY NEGATIVE: 1
VOMITING: 0
EYE PAIN: 1
DIARRHEA: 1
EYE ITCHING: 0
EYE DISCHARGE: 1
SINUS PAIN: 0
ABDOMINAL PAIN: 0

## 2023-08-05 NOTE — PROGRESS NOTES
Assessment & Plan:        ICD-10-CM    1. Acute conjunctivitis of right eye, unspecified acute conjunctivitis type  H10.31 olopatadine (PATANOL) 0.1 % ophthalmic solution      2. Impetigo  L01.00 mupirocin (BACTROBAN) 2 % external ointment            Plan/Clinical Decision Making:  Allergic Conjunctivitis:   The patient reports to the clinic with right eye pain, erythema, and discharge. Patient denies fevers, chills, SOB, chest pain, or abdominal pain. Patient has erythema of his conjunctiva, watery discharge present, no photophobia. Patient states this happened last year around the same time making it seem more allergic conjunctivitis. Patient given olopatadine for eye. Patient was informed to return if symptoms worsen    Impetigo:   Patient has crustiness along with erythema of right eyelid superiorly. Patient does not have pain in the area and states it is more crusty in the morning. Patient does not have this bilaterally. Patient most likely has impetigo and will be given Bactroban for it. Avoid in the eye.         Return if symptoms worsen or fail to improve, for in 5-7 days.     At the end of the encounter, I discussed results, diagnosis, medications. Discussed red flags for immediate return to clinic/ER, as well as indications for follow up if no improvement. Patient understood and agreed to plan. Patient was stable for discharge.        Seen with student: FERMIN Morgan    Physician Attestation   I, Love Holm PA-C, was present with the medical/BEST student who participated in the service and in the documentation of the note.  I have verified the history and personally performed the physical exam and medical decision making.  I agree with the assessment and plan of care as documented in the note.        Love Holm PA-C      Subjective:     HPI:    Landon is a 22 year old male who presents to clinic today for the following health issues:  Chief Complaint   Patient presents with    Eye Problem      RIGHT SIDE, no injury, redness, drainage and pain     HPI  Patient reports 3 days of right sided eye pain, discharge, crusting in the morning, redness, scratching, diarrhea. Denies vomiting, chest pain, SOB, sick symptoms.     History obtained from the patient.    Review of Systems   Constitutional:  Negative for chills and fever.   HENT:  Negative for ear pain, sinus pain and sore throat.    Eyes:  Positive for pain, discharge, redness and visual disturbance. Negative for photophobia and itching.   Respiratory: Negative.     Cardiovascular: Negative.    Gastrointestinal:  Positive for diarrhea. Negative for abdominal pain and vomiting.   Genitourinary: Negative.    Skin: Negative.        Patient Active Problem List   Diagnosis    Major depressive disorder, recurrent, in full remission (H)    Attention deficit hyperactivity disorder (ADHD), predominantly inattentive type        No past medical history on file.    Social History     Tobacco Use    Smoking status: Never    Smokeless tobacco: Never   Substance Use Topics    Alcohol use: Not Currently             Objective:     Vitals:    08/05/23 1447   BP: 119/77   BP Location: Right arm   Patient Position: Sitting   Cuff Size: Adult Large   Pulse: 103   Temp: 98.5  F (36.9  C)   TempSrc: Tympanic   SpO2: 96%   Weight: 93 kg (205 lb)         Physical Exam   EXAM:   Pleasant, alert, appropriate appearance. NAD.  Eye Exam:  PERRLA, EOMI, non icteric/injection. Right eye with discharge and erythema. No pain with eye movements. Right eyelid is crusty with some erythema separate from eye ball.   Ear Exam: TMs grey without bulging. Normal canals.  Normal pinna.  OroPharynx Exam:  Moist mucous membranes. No erythema, pharynx without exudate or hypertrophy.  Neck/Thyroid Exam:  No LAD.  Chest/Respiratory Exam: CTAB.  Cardiovascular Exam: RRR. No murmur or rubs.  ABD: soft, Non-tender, normal bowel sounds, no rebound/guarding.  No masses/organomegaly.  Skin: no rash or  lesion.      Results:  No results found for any visits on 08/05/23.

## 2023-09-16 ENCOUNTER — HEALTH MAINTENANCE LETTER (OUTPATIENT)
Age: 23
End: 2023-09-16

## 2023-10-07 ENCOUNTER — OFFICE VISIT (OUTPATIENT)
Dept: URGENT CARE | Facility: URGENT CARE | Age: 23
End: 2023-10-07
Payer: COMMERCIAL

## 2023-10-07 VITALS
SYSTOLIC BLOOD PRESSURE: 136 MMHG | BODY MASS INDEX: 29.95 KG/M2 | DIASTOLIC BLOOD PRESSURE: 75 MMHG | WEIGHT: 197 LBS | TEMPERATURE: 98.2 F | HEART RATE: 100 BPM | OXYGEN SATURATION: 95 %

## 2023-10-07 DIAGNOSIS — R10.13 ABDOMINAL DISCOMFORT, EPIGASTRIC: ICD-10-CM

## 2023-10-07 DIAGNOSIS — R49.0 VOICE HOARSENESS: ICD-10-CM

## 2023-10-07 DIAGNOSIS — R50.9 LOW GRADE FEVER: ICD-10-CM

## 2023-10-07 DIAGNOSIS — J02.9 SORE THROAT: Primary | ICD-10-CM

## 2023-10-07 DIAGNOSIS — H93.8X3 PLUGGED FEELING IN EAR, BILATERAL: ICD-10-CM

## 2023-10-07 DIAGNOSIS — J03.90 TONSILLITIS: ICD-10-CM

## 2023-10-07 LAB
DEPRECATED S PYO AG THROAT QL EIA: NEGATIVE
GROUP A STREP BY PCR: NOT DETECTED

## 2023-10-07 PROCEDURE — 87651 STREP A DNA AMP PROBE: CPT | Performed by: FAMILY MEDICINE

## 2023-10-07 PROCEDURE — 99213 OFFICE O/P EST LOW 20 MIN: CPT | Performed by: FAMILY MEDICINE

## 2023-10-07 RX ORDER — AMOXICILLIN 500 MG/1
500 CAPSULE ORAL 2 TIMES DAILY
Qty: 20 CAPSULE | Refills: 0 | Status: SHIPPED | OUTPATIENT
Start: 2023-10-07 | End: 2023-10-17

## 2023-10-07 NOTE — PROGRESS NOTES
Patient presents with:  Urgent Care  Pharyngitis: Pt had a fever last Sunday, fatigue, sore throat, voice is hoarse. Tested negative for covid.      Clinical Decision Making:      ICD-10-CM    1. Sore throat  J02.9 Streptococcus A Rapid Screen w/Reflex to PCR - Clinic Collect     Group A Streptococcus PCR Throat Swab     amoxicillin (AMOXIL) 500 MG capsule      2. Voice hoarseness  R49.0 Streptococcus A Rapid Screen w/Reflex to PCR - Clinic Collect     Group A Streptococcus PCR Throat Swab     amoxicillin (AMOXIL) 500 MG capsule      3. Low grade fever  R50.9 Streptococcus A Rapid Screen w/Reflex to PCR - Clinic Collect     Group A Streptococcus PCR Throat Swab     amoxicillin (AMOXIL) 500 MG capsule      4. Plugged feeling in ear, bilateral  H93.8X3 Streptococcus A Rapid Screen w/Reflex to PCR - Clinic Collect     Group A Streptococcus PCR Throat Swab     amoxicillin (AMOXIL) 500 MG capsule      5. Abdominal discomfort, epigastric  R10.13 Streptococcus A Rapid Screen w/Reflex to PCR - Clinic Collect     Group A Streptococcus PCR Throat Swab     amoxicillin (AMOXIL) 500 MG capsule      6. Tonsillitis  J03.90 amoxicillin (AMOXIL) 500 MG capsule          There are no Patient Instructions on file for this visit.    HPI:  Landon Thompson is a 22 year old male who presents today complaining of   Chief Complaint   Patient presents with    Urgent Care    Pharyngitis     Pt had a fever last Sunday, fatigue, sore throat, voice is hoarse. Tested negative for covid.         History obtained from the patient.    Problem List:  2022-02: Major depressive disorder, recurrent, in full remission (H24)  2022-02: Attention deficit hyperactivity disorder (ADHD),   predominantly inattentive type      History reviewed. No pertinent past medical history.    Social History     Tobacco Use    Smoking status: Never    Smokeless tobacco: Never   Substance Use Topics    Alcohol use: Not Currently       Review of Systems  Constitutional, HEENT,  cardiovascular, pulmonary, gi and gu systems are negative, except as otherwise noted.    Vitals:    10/07/23 1520   BP: 136/75   Pulse: 100   Temp: 98.2  F (36.8  C)   TempSrc: Tympanic   SpO2: 95%   Weight: 89.4 kg (197 lb)       Physical Exam  GENERAL: healthy, alert and no distress  HEENT: severely erythematous edematous posterior oropharynx and tonsillar enlargement. Tms normal.   NECK: no adenopathy, no asymmetry, masses, or scars and thyroid normal to palpation  RESP: lungs clear to auscultation - no rales, rhonchi or wheezes  CV: regular rate and rhythm, normal S1 S2, no S3 or S4, no murmur, click or rub  MS: no gross musculoskeletal defects noted, no edema  NEURO: Normal strength and tone, mentation intact and speech normal  PSYCH: mentation appears normal, affect normal/bright    Results:  Results for orders placed or performed in visit on 10/07/23   Streptococcus A Rapid Screen w/Reflex to PCR - Clinic Collect     Status: Normal    Specimen: Throat; Swab   Result Value Ref Range    Group A Strep antigen Negative Negative         At the end of the encounter, I discussed results, diagnosis, medications. Discussed red flags for immediate return to clinic/ER, as well as indications for follow up if no improvement. Patient understood and agreed to plan. Patient was stable for discharge.

## 2023-10-09 ENCOUNTER — APPOINTMENT (OUTPATIENT)
Dept: GENERAL RADIOLOGY | Facility: CLINIC | Age: 23
End: 2023-10-09
Attending: EMERGENCY MEDICINE
Payer: OTHER MISCELLANEOUS

## 2023-10-09 ENCOUNTER — HOSPITAL ENCOUNTER (EMERGENCY)
Facility: CLINIC | Age: 23
Discharge: HOME | End: 2023-10-09
Attending: EMERGENCY MEDICINE | Admitting: EMERGENCY MEDICINE
Payer: OTHER MISCELLANEOUS

## 2023-10-09 VITALS
RESPIRATION RATE: 20 BRPM | WEIGHT: 197 LBS | SYSTOLIC BLOOD PRESSURE: 125 MMHG | OXYGEN SATURATION: 97 % | HEART RATE: 107 BPM | DIASTOLIC BLOOD PRESSURE: 78 MMHG | BODY MASS INDEX: 29.95 KG/M2 | TEMPERATURE: 97.5 F

## 2023-10-09 DIAGNOSIS — S93.401A SPRAIN OF RIGHT ANKLE, UNSPECIFIED LIGAMENT, INITIAL ENCOUNTER: ICD-10-CM

## 2023-10-09 PROCEDURE — 73630 X-RAY EXAM OF FOOT: CPT | Mod: RT

## 2023-10-09 PROCEDURE — 73610 X-RAY EXAM OF ANKLE: CPT | Mod: RT

## 2023-10-09 PROCEDURE — 99284 EMERGENCY DEPT VISIT MOD MDM: CPT

## 2023-10-09 PROCEDURE — 250N000013 HC RX MED GY IP 250 OP 250 PS 637: Performed by: EMERGENCY MEDICINE

## 2023-10-09 RX ORDER — IBUPROFEN 600 MG/1
600 TABLET, FILM COATED ORAL ONCE
Status: COMPLETED | OUTPATIENT
Start: 2023-10-09 | End: 2023-10-09

## 2023-10-09 RX ADMIN — IBUPROFEN 600 MG: 600 TABLET ORAL at 11:10

## 2023-10-09 ASSESSMENT — ACTIVITIES OF DAILY LIVING (ADL): ADLS_ACUITY_SCORE: 33

## 2023-10-09 NOTE — ED PROVIDER NOTES
History     Chief Complaint:  Foot Injury       HPI   Landon Thompson is a 22 year old male right foot and ankle pain.  This occurred at approximately 915 this morning.  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  At that time the patient hit his right foot against a wall while chasing him and twisted the ankle as well.  He is able to walk on it, but has pain.  He has not taken anything for the pain.      Independent Historian:   Patient's girlfriend who is at bedside agree reports agreement with the above.  She has been able to help him with ambulation.    Review of External Notes:   None    Medications:    amoxicillin (AMOXIL) 500 MG capsule  cetirizine-pseudoePHEDrine ER (ZYRTEC-D) 5-120 MG 12 hr tablet  FLUoxetine (PROZAC) 20 MG capsule  methylphenidate (CONCERTA) 54 MG CR tablet  Methylphenidate HCl ER 45 MG CP24  methylphenidate HCl ER, OSM, (CONCERTA) 54 MG CR tablet  mupirocin (BACTROBAN) 2 % external ointment  nystatin (MYCOSTATIN) 427310 UNIT/GM external cream  olopatadine (PATANOL) 0.1 % ophthalmic solution        Past Medical History:    No past medical history on file.    Past Surgical History:    Past Surgical History:   Procedure Laterality Date    WISDOM TOOTH EXTRACTION          Physical Exam   Patient Vitals for the past 24 hrs:   BP Temp Temp src Pulse Resp SpO2 Weight   10/09/23 1031 (!) 155/80 97.5  F (36.4  C) Temporal 106 20 97 % 89.4 kg (197 lb)        Physical Exam  General:  Sitting on bed, comfortable appearing.   HENT:  No obvious trauma to head  Right Ear:  External ear normal.   Left Ear:  External ear normal.   Nose:  Nose normal.   Eyes:  Conjunctivae and EOM are normal.  Neck: Normal range of motion. Neck supple. No tracheal deviation present.   Pulm/Chest: No respiratory distress  M/S: Normal range of motion.  Mild pain over the dorsal proximal right foot and over the right ATF and PTF ligament with some surrounding edema, but no other  tenderness to right ankle over medial malleoli, 5th metatarsal, proximal fibula, or elsewhere on foot or heel. No erythema or warmth to joint. No abrasion or skin lesion. No pain over distal achilles or deformity over distal achilles. Compartments soft. No calf pain or swelling. DP pulses +2. No swelling. Cap refill <2 seconds.  Neuro: Alert. GCS 15.   Skin: Skin is warm and dry. No rash noted. Not diaphoretic.   Psych: Normal mood and affect. Behavior is normal.     Emergency Department Course   Imaging:  XR Ankle Right G/E 3 Views   Preliminary Result   IMPRESSION: Intact appearing ankle mortise and distal syndesmosis. No   acute displaced right ankle fracture identified. Mild ankle soft   tissue swelling. Tibiotalar and hindfoot joint spaces are normal.   Ankle joint effusion.      Anatomic alignment right foot. No acute displaced right foot fracture   is identified. No advanced right foot joint space narrowing. No   localizing right foot soft tissue swelling. Prominent medial   navicular.           XR Foot Right G/E 3 Views   Preliminary Result   IMPRESSION: Intact appearing ankle mortise and distal syndesmosis. No   acute displaced right ankle fracture identified. Mild ankle soft   tissue swelling. Tibiotalar and hindfoot joint spaces are normal.   Ankle joint effusion.      Anatomic alignment right foot. No acute displaced right foot fracture   is identified. No advanced right foot joint space narrowing. No   localizing right foot soft tissue swelling. Prominent medial   navicular.              Report per radiology    Emergency Department Course & Assessments:  Interventions:  Medications   ibuprofen (ADVIL/MOTRIN) tablet 600 mg (600 mg Oral $Given 10/9/23 1110)        Assessments:  1049 I evaluated the patient, obtained the above history and performed the physical exam.  1155 Updated patient.    Independent Interpretation (X-rays, CTs, rhythm strip):  Foot and ankle x-ray of the right lower extremity reveal  no fracture specifically to the distal fibula or dorsal navicular.    Consultations/Discussion of Management or Tests:  None     Social Determinants of Health affecting care:   None    Disposition:  The patient was discharged to home.     Impression & Plan    Medical Decision Making:  Landon Thompson is a very pleasant 22 year old year old patient who presents to the emergency department with concern of an injury as above.  No other injury with this.  X-rays obtained of the foot and ankle.  Fortunately no fracture seen.  His main area of pain is over the ATF and PTF ligaments consistent with a ankle sprain. I discussed that there may be an additional injury, such as an occult fracture, and considered ordering advanced imaging such as a CT or MRI to assess for this.  After reviewing the risk and benefit of this imaging, it was agreed that if the patient develops persistent or increased or new pain, the patient should return or follow up out patient for re-evaluation and consideration of the additional imaging at that time.  RICE treatment recommended.  He is placed in a gel splint and already has crutches and declined another pair.  Patient is provided a work note requesting limited use of the right lower extremity until cleared by the PCP.  He declined a prescription for ibuprofen 600 mg, but will take three 200 mg tablets every 6 hours as needed for pain.    The treatment plan was discussed with the patient and they expressed understanding of this plan and consented to the plan.  In addition, the patient will return to the emergency department if their symptoms persist, worsen, if new symptoms arise or if there is any concern as other pathology may be present that is not evident at this time. They also understand the importance of close follow up in the clinic and if unable to do so will return to the emergency department for a reevaluation. All questions were answered.    Diagnosis:    ICD-10-CM    1. Sprain of  right ankle, unspecified ligament, initial encounter  S93.401A Ankle/Foot Bracing Supplies DME Air Ankle Stirrup Brace; Right           Discharge Medications:  New Prescriptions    No medications on file     10/9/2023   Aron Varela, Aron Castillo DO  10/09/23 1200

## 2023-10-09 NOTE — LETTER
[facility] EMERGENCY DEPT  [address] [place] [address]  [phone]      2023    Landon Thompson  [address] [place] [address]  [phone] (home)     : 2000      To Whom it may concern:    Landon Thompson was seen in our Emergency Department today, 2023 for an injury that was reported to be work related.      The employee must keep the injured site elevated. Light duty until cleared by primary care provider.    Sincerely,    TIMOTHY Bobby

## 2023-10-09 NOTE — ED TRIAGE NOTES
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

## 2023-10-13 ENCOUNTER — VIRTUAL VISIT (OUTPATIENT)
Dept: FAMILY MEDICINE | Facility: CLINIC | Age: 23
End: 2023-10-13
Payer: OTHER MISCELLANEOUS

## 2023-10-13 DIAGNOSIS — S93.401D SPRAIN OF RIGHT ANKLE, UNSPECIFIED LIGAMENT, SUBSEQUENT ENCOUNTER: Primary | ICD-10-CM

## 2023-10-13 PROCEDURE — 99212 OFFICE O/P EST SF 10 MIN: CPT | Mod: 95 | Performed by: INTERNAL MEDICINE

## 2023-10-13 ASSESSMENT — PATIENT HEALTH QUESTIONNAIRE - PHQ9
SUM OF ALL RESPONSES TO PHQ QUESTIONS 1-9: 8
SUM OF ALL RESPONSES TO PHQ QUESTIONS 1-9: 8
10. IF YOU CHECKED OFF ANY PROBLEMS, HOW DIFFICULT HAVE THESE PROBLEMS MADE IT FOR YOU TO DO YOUR WORK, TAKE CARE OF THINGS AT HOME, OR GET ALONG WITH OTHER PEOPLE: NOT DIFFICULT AT ALL

## 2023-10-13 NOTE — PROGRESS NOTES
Landon is a 22 year old who is being evaluated via a billable video visit.      How would you like to obtain your AVS?   If the video visit is dropped, the invitation should be resent by:   Will anyone else be joining your video visit?               Carlos Navarrete is a 22 year old, presenting for the following health issues:  No chief complaint on file.      This is a follow-up to the patient's emergency room visit on October 9 which are reviewed.  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  He was seen in the emergency room as noted and had an x-ray done and evaluation and it was no fracture.  He was given a brace and crutches.  He noted that the swelling went down and Wednesday and has been walking since yesterday without any pain or swelling.  He would like to be able to go to work.  I was able to visualize the ankle a bit today but the quality of the video was poor.  As best I can see it is not red or swollen.      Vitals:  No vitals were obtained today due to virtual visit.    Physical Exam   GENERAL: Healthy, alert and no distress  EYES: Eyes grossly normal to inspection.  No discharge or erythema, or obvious scleral/conjunctival abnormalities.  RESP: No audible wheeze, cough, or visible cyanosis.  No visible retractions or increased work of breathing.    SKIN: Visible skin clear. No significant rash, abnormal pigmentation or lesions.  NEURO: Cranial nerves grossly intact.  Mentation and speech appropriate for age.  PSYCH: Mentation appears normal, affect normal/bright, judgement and insight intact, normal speech and appearance well-groomed.      ASSESSMENT:  Ankle sprain    PLAN:  Ok to return to work without restrictions but I did advise him to try not to be overly active or do any running and keep it up when he can.  If he does develop swelling or significant pain he will let us know    Stephon Gonzalez M.D.  9 minutes on the day of the encounter doing chart review, history and  exam, documentation and further activities as noted above.            Video-Visit Details    Type of service:  Video Visit     Originating Location (pt. Location): Home    Distant Location (provider location):  On-site  Platform used for Video Visit: [place]

## 2023-10-13 NOTE — LETTER
October 13, 2023      Landon DREW Jay  [address] [place] [address]        To Whom It May Concern:    Landon Thompson was seen in our clinic virtually today. He may return to work without restrictions.      Sincerely,        Stephon Gonzalez MD

## 2023-10-23 ENCOUNTER — MYC MEDICAL ADVICE (OUTPATIENT)
Dept: INTERNAL MEDICINE | Facility: CLINIC | Age: 23
End: 2023-10-23

## 2023-10-23 ENCOUNTER — OFFICE VISIT (OUTPATIENT)
Dept: INTERNAL MEDICINE | Facility: CLINIC | Age: 23
End: 2023-10-23
Payer: OTHER MISCELLANEOUS

## 2023-10-23 VITALS
SYSTOLIC BLOOD PRESSURE: 136 MMHG | WEIGHT: 202 LBS | HEIGHT: 69 IN | HEART RATE: 105 BPM | TEMPERATURE: 97.6 F | RESPIRATION RATE: 20 BRPM | BODY MASS INDEX: 29.92 KG/M2 | DIASTOLIC BLOOD PRESSURE: 81 MMHG | OXYGEN SATURATION: 96 %

## 2023-10-23 DIAGNOSIS — S99.911D INJURY OF RIGHT ANKLE, SUBSEQUENT ENCOUNTER: Primary | ICD-10-CM

## 2023-10-23 PROCEDURE — 99213 OFFICE O/P EST LOW 20 MIN: CPT | Performed by: INTERNAL MEDICINE

## 2023-10-23 NOTE — PROGRESS NOTES
"  Assessment & Plan     Injury of right ankle, subsequent encounter  Based on exam today, capable of no more than sedentary work at least for the next 2 weeks.  Filled out work release form conveying this, and based on his current job description, it sounds like this restriction will prohibit him from working for the next 2 weeks.           MED REC REQUIRED  Post Medication Reconciliation Status:   BMI:   Estimated body mass index is 29.83 kg/m  as calculated from the following:    Height as of this encounter: 1.753 m (5' 9\").    Weight as of this encounter: 91.6 kg (202 lb).           Gopal Velásquez MD  [facility]    Carlos Navarrete is a 23 year old, presenting for the following health issues:  Hospital F/U (Injured his right achilles on 10/09/2023 at work. He is still having issue with walking, he wears a ankle brace. )      10/23/2023     4:15 PM   Additional Questions   Roomed by Yissel   Accompanied by Self       HPI       Suffered inversion injury of his right ankle at the beginning of October.  X-rays negative at that time.  His symptoms have improved, but have been very slow to improve.  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        Review of Systems   Constitutional, HEENT, cardiovascular, pulmonary, gi and gu systems are negative, except as otherwise noted.      Objective    /81   Pulse 105   Temp 97.6  F (36.4  C)   Resp 20   Ht 1.753 m (5' 9\")   Wt 91.6 kg (202 lb)   SpO2 96%   BMI 29.83 kg/m    Body mass index is 29.83 kg/m .  Physical Exam   GENERAL: healthy, alert   MS: His right ankle is mildly swollen laterally, has tenderness over his lateral malleolus.  Pain with passive inversion of his right ankle                      "

## 2023-10-25 ENCOUNTER — MYC REFILL (OUTPATIENT)
Dept: FAMILY MEDICINE | Facility: CLINIC | Age: 23
End: 2023-10-25
Payer: COMMERCIAL

## 2023-10-25 DIAGNOSIS — F90.0 ATTENTION DEFICIT HYPERACTIVITY DISORDER (ADHD), PREDOMINANTLY INATTENTIVE TYPE: ICD-10-CM

## 2023-10-25 RX ORDER — METHYLPHENIDATE HYDROCHLORIDE 54 MG/1
54 TABLET ORAL EVERY MORNING
Qty: 90 TABLET | Refills: 0 | Status: SHIPPED | OUTPATIENT
Start: 2023-10-25 | End: 2023-11-29

## 2023-11-03 ENCOUNTER — VIRTUAL VISIT (OUTPATIENT)
Dept: FAMILY MEDICINE | Facility: CLINIC | Age: 23
End: 2023-11-03
Payer: OTHER MISCELLANEOUS

## 2023-11-03 DIAGNOSIS — S93.401D SPRAIN OF RIGHT ANKLE, UNSPECIFIED LIGAMENT, SUBSEQUENT ENCOUNTER: Primary | ICD-10-CM

## 2023-11-03 PROCEDURE — 99213 OFFICE O/P EST LOW 20 MIN: CPT | Mod: VID | Performed by: INTERNAL MEDICINE

## 2023-11-03 NOTE — LETTER
November 3, 2023      Landon DREW Jay  [address] [place] [address]        To Whom It May Concern:    Landon Thompson was seen in our clinic via virtual visit. He may return to work with the following: avoid strenuous activities or sprinting for about one more week.      Sincerely,        Jesse Martini MD

## 2023-11-03 NOTE — PROGRESS NOTES
"Landon is a 23 year old who is being evaluated via a billable video visit.      How would you like to obtain your AVS? MyChart  If the video visit is dropped, the invitation should be resent by: Text to cell phone: [phone]  Will anyone else be joining your video visit? No          Assessment & Plan   Problem List Items Addressed This Visit    None  Visit Diagnoses       Sprain of right ankle, unspecified ligament, subsequent encounter    -  Primary    Relevant Orders    Physical Therapy Referral        Letter created for him to return to work, as per his report he is back to baseline except that he would need 1 more week to recover as far as sprinting.  He denies any pain he is wearing some ankle braces when ambulating.  Denies any swelling in his leg.  He is agreeable to see PT for some rehab exercises which I strongly recommended.  All questions answered.  Again this was a virtual visit could not assess for musculoskeletal derangement of his right ankle.         BMI:   Estimated body mass index is 29.83 kg/m  as calculated from the following:    Height as of 10/23/23: 1.753 m (5' 9\").    Weight as of 10/23/23: 91.6 kg (202 lb).         Jesse Martini MD  [facility]    Carlos Navarrete is a 23 year old, presenting for the following health issues:  Follow Up (WORK FOOT INJURY)        11/3/2023     8:48 AM   Additional Questions   Roomed by Earl   Accompanied by Not applicable, by themselves       HPI   Patient presenting requesting a letter for return to work.  He sustained an injury ankle sprain right side.  Patient has been doing better he feels he can return to work back to baseline activities except for sprinting.      Review of Systems   Constitutional, HEENT, cardiovascular, pulmonary, gi and gu systems are negative, except as otherwise noted.      Objective           Vitals:  No vitals were obtained today due to virtual visit.    Physical Exam   GENERAL: Healthy, alert and no " distress  EYES: Eyes grossly normal to inspection.  No discharge or erythema, or obvious scleral/conjunctival abnormalities.  RESP: No audible wheeze, cough, or visible cyanosis.  No visible retractions or increased work of breathing.    SKIN: Visible skin clear. No significant rash, abnormal pigmentation or lesions.  NEURO: Cranial nerves grossly intact.  Mentation and speech appropriate for age.  PSYCH: Mentation appears normal, affect normal/bright, judgement and insight intact, normal speech and appearance well-groomed.    Office Visit on 10/07/2023   Component Date Value Ref Range Status    Group A Strep antigen 10/07/2023 Negative  Negative Final    Group A strep by PCR 10/07/2023 Not Detected  Not Detected Final             Video-Visit Details    Type of service:  Video Visit     Originating Location (pt. Location): Home    Distant Location (provider location):  On-site  Platform used for Video Visit: [place]

## 2023-11-09 ENCOUNTER — ANCILLARY PROCEDURE (OUTPATIENT)
Dept: GENERAL RADIOLOGY | Facility: CLINIC | Age: 23
End: 2023-11-09
Attending: INTERNAL MEDICINE
Payer: OTHER MISCELLANEOUS

## 2023-11-09 ENCOUNTER — OFFICE VISIT (OUTPATIENT)
Dept: FAMILY MEDICINE | Facility: CLINIC | Age: 23
End: 2023-11-09
Payer: OTHER MISCELLANEOUS

## 2023-11-09 VITALS
OXYGEN SATURATION: 95 % | DIASTOLIC BLOOD PRESSURE: 88 MMHG | HEIGHT: 69 IN | RESPIRATION RATE: 16 BRPM | SYSTOLIC BLOOD PRESSURE: 127 MMHG | WEIGHT: 212.2 LBS | HEART RATE: 98 BPM | BODY MASS INDEX: 31.43 KG/M2 | TEMPERATURE: 98.1 F

## 2023-11-09 DIAGNOSIS — F90.0 ATTENTION DEFICIT HYPERACTIVITY DISORDER (ADHD), PREDOMINANTLY INATTENTIVE TYPE: ICD-10-CM

## 2023-11-09 DIAGNOSIS — M25.571 PAIN IN JOINT, ANKLE AND FOOT, RIGHT: Primary | ICD-10-CM

## 2023-11-09 DIAGNOSIS — Z23 NEED FOR PROPHYLACTIC VACCINATION AND INOCULATION AGAINST INFLUENZA: ICD-10-CM

## 2023-11-09 DIAGNOSIS — Z23 NEED FOR VACCINATION: ICD-10-CM

## 2023-11-09 DIAGNOSIS — M25.571 PAIN IN JOINT, ANKLE AND FOOT, RIGHT: ICD-10-CM

## 2023-11-09 PROCEDURE — 90715 TDAP VACCINE 7 YRS/> IM: CPT | Performed by: INTERNAL MEDICINE

## 2023-11-09 PROCEDURE — 90471 IMMUNIZATION ADMIN: CPT | Performed by: INTERNAL MEDICINE

## 2023-11-09 PROCEDURE — 73610 X-RAY EXAM OF ANKLE: CPT | Mod: TC | Performed by: RADIOLOGY

## 2023-11-09 PROCEDURE — 90686 IIV4 VACC NO PRSV 0.5 ML IM: CPT | Performed by: INTERNAL MEDICINE

## 2023-11-09 PROCEDURE — 90472 IMMUNIZATION ADMIN EACH ADD: CPT | Performed by: INTERNAL MEDICINE

## 2023-11-09 PROCEDURE — 73630 X-RAY EXAM OF FOOT: CPT | Mod: TC | Performed by: RADIOLOGY

## 2023-11-09 PROCEDURE — 99214 OFFICE O/P EST MOD 30 MIN: CPT | Mod: 25 | Performed by: INTERNAL MEDICINE

## 2023-11-09 RX ORDER — METHYLPHENIDATE HYDROCHLORIDE 54 MG/1
54 TABLET ORAL EVERY MORNING
Qty: 30 TABLET | Refills: 0 | Status: SHIPPED | OUTPATIENT
Start: 2023-11-09 | End: 2024-02-04

## 2023-11-09 ASSESSMENT — PAIN SCALES - GENERAL: PAINLEVEL: SEVERE PAIN (6)

## 2023-11-09 NOTE — LETTER
November 9, 2023            To Whom it May Concern:          Landon Thompson has a medical problem for which he will not be able to return to physical activity at work until at least after 12/9/2023.  He is fit to perform sitting tasks or desk work if that work is available.      Sincerely,    Kirill Walker MD

## 2023-11-09 NOTE — PROGRESS NOTES
Assessment & Plan     Pain in joint, ankle and foot, right  Repeat x-rays as swelling after the injury may have obscured a small fracture or an avulsion fracture which may be prolonging his recovery.  If no fracture is present, I instructed him to contact physical therapy to schedule sessions to help with muscle strengthening to support the ankle joint.  If physical therapy is the way to get him better, it should work after about 3 to 4 weeks of earnest participation in rehab activities.  At that time, he can return to work without restrictions.  We planned an in person follow-up appointment to reassess at that time interval.  If there is a fracture, I think he should be seen by orthopedics.  If he fails to improve with physical therapy over 3 to 4 weeks, then I think he should also be seen by orthopedics.  This was all explained to him in detail.  I anticipate there may be some additional paperwork to complete as it pertains to his absence from work and ability to return to work.  - XR Ankle Right G/E 3 Views; Future  - XR Foot Right G/E 3 Views; Future    Attention deficit hyperactivity disorder (ADHD), predominantly inattentive type  I provided him with a paper prescription for his methylphenidate so that he could search around for pharmacies that may have the medication in stock  - methylphenidate HCl ER, OSM, (CONCERTA) 54 MG CR tablet; Take 1 tablet (54 mg) by mouth every morning        No LOS data to display   Time spent by me doing chart review, history and exam, documentation and further activities per the note           FUTURE APPOINTMENTS:       - Follow-up visit in 1 month to reassess ankle pain and see if he can return to work    Kirill Walker MD  [facility] TRAM Navarrete is a 23 year old, presenting for the following health issues:  Follow Up (Pain in right leg)        11/9/2023     4:02 PM   Additional Questions   Roomed by Earl   Accompanied by Not applicable, by  "themselves       History of Present Illness       Reason for visit:  Injury    He eats 2-3 servings of fruits and vegetables daily.He consumes 0 sweetened beverage(s) daily.He exercises with enough effort to increase his heart rate 10 to 19 minutes per day.  He exercises with enough effort to increase his heart rate 4 days per week.   He is taking medications regularly.           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 and his job description requires him to run around frequently with the kids during activities    About a month ago he suffered an inversion injury of the right ankle and has had ankle pain since    He was seen in the emergency department and had negative plain films at the time    He has been using a stirrup brace since then    He is someone discussed physical therapy with him, but he never did it    He notes that the swelling in his ankle has improved since the time of the injury    However, he continues to have pain when he tries to run with kids after returning to work several days ago    He needs a refill of his methylphenidate and he has been having trouble locating the medication at his pharmacy        Review of Systems         Objective    /88 (BP Location: Right arm, Patient Position: Sitting, Cuff Size: Adult Large)   Pulse 98   Temp 98.1  F (36.7  C) (Oral)   Resp 16   Ht 1.753 m (5' 9\")   Wt 96.3 kg (212 lb 3.2 oz)   SpO2 95%   BMI 31.34 kg/m    Body mass index is 31.34 kg/m .  Physical Exam   General: This is a well-appearing man in no acute distress.  Mental State: He does have a little bit of trouble focusing on instructions and asked me to repeat them on several occasions.  Ankle: There is no ankle swelling, there is is no focal areas of bony tenderness, there is normal strength, normal gait.    X-rays are pending                  "

## 2023-11-10 NOTE — RESULT ENCOUNTER NOTE
The following letter pertains to your most recent diagnostic tests:    Good news! The repeat x-ray do NOT show any fractures.  You can proceed with physical therapy as we discussed in clinic.      Sincerely,    Dr. Walker

## 2023-11-13 ENCOUNTER — THERAPY VISIT (OUTPATIENT)
Dept: PHYSICAL THERAPY | Facility: CLINIC | Age: 23
End: 2023-11-13
Attending: INTERNAL MEDICINE
Payer: OTHER MISCELLANEOUS

## 2023-11-13 DIAGNOSIS — M25.571 PAIN IN JOINT, ANKLE AND FOOT, RIGHT: ICD-10-CM

## 2023-11-13 DIAGNOSIS — M25.571 ANKLE PAIN, RIGHT: Primary | ICD-10-CM

## 2023-11-13 PROCEDURE — 97110 THERAPEUTIC EXERCISES: CPT | Mod: GP | Performed by: PHYSICAL THERAPIST

## 2023-11-13 PROCEDURE — 97161 PT EVAL LOW COMPLEX 20 MIN: CPT | Mod: GP | Performed by: PHYSICAL THERAPIST

## 2023-11-13 NOTE — PROGRESS NOTES
PHYSICAL THERAPY EVALUATION  Type of Visit: Evaluation    Patient presents to PT for treatment of right ankle pain.  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  He experienced initial pain and swelling which improved with time off work.  Ankle pain increased again with return to work.  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  Patient complains of latera/anterior right ankle pain which is worse with walking, especially fast.  Initial findings include abnormal gait pattern with loss of push off and tibial glide, poor squat mechanics with loss of ankle dorsiflexion and painful single toe raises.    See electronic medical record for Abuse and Falls Screening details.    Subjective       Presenting condition or subjective complaint: Recommend by Dr to have PT for ankle injury  Date of onset: 10/09/23    Relevant medical history: Depression; Migraines or headaches   Dates & types of surgery: N/A    Prior diagnostic imaging/testing results: X-ray     Prior therapy history for the same diagnosis, illness or injury: No      Prior Level of Function  Transfers:   Ambulation:   ADL:   IADL:     Living Environment  Social support: With a significant other or spouse   Type of home: Apartment/condo   Stairs to enter the home: Yes 1 Is there a railing: Yes   Ramp: No   Stairs inside the home: No       Help at home: None  Equipment owned:       Employment: Yes Behavior Technician  Hobbies/Interests: Video Games, Cooking, Cats    Patient goals for therapy: Run with clients at work, reduce pain    Pain assessment:      Objective   FOOT/ANKLE EVALUATION  PAIN: Pain Level at Rest: 2/10  Pain Level with Use: 5/10  Pain Location: lateral/anterior right ankle  Pain is Exacerbated By: walking, especially fast  INTEGUMENTARY (edema, incisions): WNL  POSTURE:   GAIT:   Weightbearing Status: WBAT  Assistive Device(s): None  Gait Deviations:  loss of push off on right , loss of  tibial glide/DF. Out toeing right  BALANCE/PROPRIOCEPTION:  single leg stance 7 sec on right vs 30 sec on left  WEIGHT BEARING ALIGNMENT:     NON-WEIGHTBEARING ALIGNMENT:    ROM:   (Degrees) Left AROM Left PROM  Right AROM Right PROM   Ankle Dorsiflexion 12  5    Ankle Plantarflexion NL NL     Ankle Inversion NL NL     Ankle Eversion NL NL     Great Toe Flexion       Great Toe Extension       Pain:   End feel:     STRENGTH: resisted ankle DF, inversion and eversion are strong with MMT.  Unable to do > 1 toe raise on right  FLEXIBILITY:   SPECIAL TESTS:   FUNCTIONAL TESTS: Double Leg Squat: poor control with loss of functional right ankle DF  PALPATION:   + Tenderness at Location Left Right   Gastroc/Soleus     Achilles Tendon     Anterior Tibialis     Posterior Tibialis     Incisional     Deltoid Ligament     Plantar Fascia     Navicular     Medial Calcaneal     Peroneals     Anterior Talofibular Ligament  +   Posterior Talofibular Ligament     Calcaneofibular Ligament     Medial Malleolus     Lateral Malleolus     Anterior Tibiofibular Ligament     Posterior Tibiofibular Ligament       JOINT MOBILITY:     Assessment & Plan   CLINICAL IMPRESSIONS  Medical Diagnosis: right ankle pain    Treatment Diagnosis:     Impression/Assessment: Patient is a 23 year old male with right ankle complaints.  The following significant findings have been identified: Pain, Decreased ROM/flexibility, Decreased strength, and Impaired gait. These impairments interfere with their ability to perform work tasks, recreational activities, and community mobility as compared to previous level of function.         Clinical Decision Making (Complexity):  Clinical Presentation: Stable/Uncomplicated  Clinical Presentation Rationale: based on medical and personal factors listed in PT evaluation  Clinical Decision Making (Complexity): Low complexity    PLAN OF CARE  Treatment Interventions:      Long Term Goals     PT Goal 1  Goal Identifier:  ambulation  Goal Description: amb 1 mile with NL gait  Rationale: to maximize safety and independence within the community  Goal Progress: ambulates with loss of right ankle pushoff and loss of df  Target Date: 12/25/23      Frequency of Treatment: 1x/week to 2x/mo per scheduling availability  Duration of Treatment:      Recommended Referrals to Other Professionals: Physical Therapy  Education Assessment:   Learner/Method: Patient;Listening;Reading;Demonstration;Pictures/Video;No Barriers to Learning    Risks and benefits of evaluation/treatment have been explained.   Patient/Family/caregiver agrees with Plan of Care.     Evaluation Time:     PT Eval, Low Complexity Minutes (83136): 20       Signing Clinician: Cecilia Castellanos PT

## 2023-11-29 ENCOUNTER — OFFICE VISIT (OUTPATIENT)
Dept: FAMILY MEDICINE | Facility: CLINIC | Age: 23
End: 2023-11-29
Payer: COMMERCIAL

## 2023-11-29 VITALS
WEIGHT: 209 LBS | RESPIRATION RATE: 16 BRPM | BODY MASS INDEX: 30.96 KG/M2 | HEIGHT: 69 IN | DIASTOLIC BLOOD PRESSURE: 73 MMHG | OXYGEN SATURATION: 95 % | HEART RATE: 83 BPM | SYSTOLIC BLOOD PRESSURE: 112 MMHG

## 2023-11-29 DIAGNOSIS — Z00.00 ROUTINE GENERAL MEDICAL EXAMINATION AT A HEALTH CARE FACILITY: Primary | ICD-10-CM

## 2023-11-29 DIAGNOSIS — F90.0 ATTENTION DEFICIT HYPERACTIVITY DISORDER (ADHD), PREDOMINANTLY INATTENTIVE TYPE: ICD-10-CM

## 2023-11-29 DIAGNOSIS — Z23 HIGH PRIORITY FOR 2019-NCOV VACCINE: ICD-10-CM

## 2023-11-29 DIAGNOSIS — M25.571 ACUTE RIGHT ANKLE PAIN: ICD-10-CM

## 2023-11-29 DIAGNOSIS — F33.42 MAJOR DEPRESSIVE DISORDER, RECURRENT, IN FULL REMISSION (H): ICD-10-CM

## 2023-11-29 PROCEDURE — 99395 PREV VISIT EST AGE 18-39: CPT | Performed by: INTERNAL MEDICINE

## 2023-11-29 PROCEDURE — 90480 ADMN SARSCOV2 VAC 1/ONLY CMP: CPT | Performed by: INTERNAL MEDICINE

## 2023-11-29 PROCEDURE — 91320 SARSCV2 VAC 30MCG TRS-SUC IM: CPT | Performed by: INTERNAL MEDICINE

## 2023-11-29 ASSESSMENT — PATIENT HEALTH QUESTIONNAIRE - PHQ9: SUM OF ALL RESPONSES TO PHQ QUESTIONS 1-9: 9

## 2023-11-29 ASSESSMENT — PAIN SCALES - GENERAL: PAINLEVEL: NO PAIN (0)

## 2023-11-29 NOTE — PROGRESS NOTES
"SUBJECTIVE:   Landon is a 23 year old, presenting for the following:  Physical    Healthy Habits:     Getting at least 3 servings of Calcium per day:  Yes    Bi-annual eye exam:  Yes    Sleep apnea or symptoms of sleep apnea:  None    Diet:  Regular (no restrictions)    Frequency of exercise:  4-5 days/week    Duration of exercise:  45-60 minutes    Taking medications regularly:  Yes    Barriers to taking medications:  None    Medication side effects:  None              2/23/2023     9:22 AM 7/12/2023     8:01 AM 10/13/2023    11:46 AM   PHQ   PHQ-9 Total Score 11 11 8   Q9: Thoughts of better off dead/self-harm past 2 weeks Not at all Not at all Not at all           Social History     Tobacco Use    Smoking status: Never    Smokeless tobacco: Never   Substance Use Topics    Alcohol use: Yes     Comment: occ             11/29/2023     7:03 AM   Alcohol Use   Prescreen: >3 drinks/day or >7 drinks/week? No          No data to display                Last PSA: No results found for: \"PSA\"    Reviewed orders with patient. Reviewed health maintenance and updated orders accordingly - Yes  BP Readings from Last 3 Encounters:   11/29/23 112/73   11/09/23 127/88   10/23/23 136/81    Wt Readings from Last 3 Encounters:   11/29/23 94.8 kg (209 lb)   11/09/23 96.3 kg (212 lb 3.2 oz)   10/23/23 91.6 kg (202 lb)                  Patient Active Problem List   Diagnosis    Major depressive disorder, recurrent, in full remission (H24)    Attention deficit hyperactivity disorder (ADHD), predominantly inattentive type    Ankle pain, right     Past Surgical History:   Procedure Laterality Date    WISDOM TOOTH EXTRACTION         Social History     Tobacco Use    Smoking status: Never    Smokeless tobacco: Never   Substance Use Topics    Alcohol use: Yes     Comment: occ     No family history on file.      Current Outpatient Medications   Medication Sig Dispense Refill    FLUoxetine (PROZAC) 20 MG capsule Take 20 mg by mouth daily      " "methylphenidate HCl ER, OSM, (CONCERTA) 54 MG CR tablet Take 1 tablet (54 mg) by mouth every morning 30 tablet 0    cetirizine-pseudoePHEDrine ER (ZYRTEC-D) 5-120 MG 12 hr tablet Take 1 tablet by mouth 2 times daily 28 tablet 0       Reviewed and updated as needed this visit by clinical staff   Tobacco  Allergies  Meds              Reviewed and updated as needed this visit by Provider                 Past Medical History:   Diagnosis Date    Depressive disorder       Past Surgical History:   Procedure Laterality Date    WISDOM TOOTH EXTRACTION         Review of Systems  A 10 organ systems ROS is negative other than any pertinent positives or negatives previously stated.     OBJECTIVE:   /73 (BP Location: Left arm, Patient Position: Sitting, Cuff Size: Adult Large)   Pulse 83   Resp 16   Ht 1.75 m (5' 8.9\")   Wt 94.8 kg (209 lb)   SpO2 95%   BMI 30.96 kg/m      Physical Exam  GENERAL: healthy, alert and no distress  EYES: Eyes grossly normal to inspection, PERRL and conjunctivae and sclerae normal  HENT: ear canals and TM's normal, nose and mouth without ulcers or lesions  NECK: no adenopathy, no asymmetry, masses, or scars and thyroid normal to palpation  RESP: lungs clear to auscultation - no rales, rhonchi or wheezes  CV: regular rate and rhythm, normal S1 S2, no S3 or S4, no murmur, click or rub, no peripheral edema and peripheral pulses strong  ABDOMEN: soft, nontender, no hepatosplenomegaly, no masses and bowel sounds normal   (male): normal male genitalia without lesions or urethral discharge, no hernia  MS: no gross musculoskeletal defects noted, no edema  SKIN: no suspicious lesions or rashes  NEURO: Normal strength and tone, mentation intact and speech normal  PSYCH: mentation appears normal, affect normal/bright  Ankle with less swelling, normal gait, no bony tenderness       ASSESSMENT/PLAN:       ICD-10-CM    1. Routine general medical examination at a health care facility  Z00.00     " "  2. Acute right ankle pain  M25.571       3. Attention deficit hyperactivity disorder (ADHD), predominantly inattentive type  F90.0       4. Major depressive disorder, recurrent, in full remission (H24)  F33.42       5. High priority for 2019-nCoV vaccine  Z23         -improving ankle symptoms with PT, he can job now, but not 'sprint', he feels confident he can return to work on 23 as previously specified in documentation to his workplace; repeat x-ray was negative for fractures  -ADHD stable, continue current stimulant therapy  -mood stable, exercise really helps in his observation, so I encouraged continuing with regular physical activity for both mind and body; continue current prozac  -COVID shot today        COUNSELING:   Reviewed preventive health counseling, as reflected in patient instructions  Special attention given to:        Regular exercise       Healthy diet/nutrition       Immunizations  Vaccinated for: Covid-19           Consider Hep C screening for all patients one time for ages 18-79 years       HIV screeninx in teen years, 1x in adult years, and at intervals if high risk      BMI:   Estimated body mass index is 30.96 kg/m  as calculated from the following:    Height as of this encounter: 1.75 m (5' 8.9\").    Weight as of this encounter: 94.8 kg (209 lb).   Weight management plan: Discussed healthy diet and exercise guidelines      He reports that he has never smoked. He has never used smokeless tobacco.          Kirill Walker MD  [facility]  "

## 2023-12-01 ENCOUNTER — THERAPY VISIT (OUTPATIENT)
Dept: PHYSICAL THERAPY | Facility: CLINIC | Age: 23
End: 2023-12-01
Payer: OTHER MISCELLANEOUS

## 2023-12-01 DIAGNOSIS — M25.571 ACUTE RIGHT ANKLE PAIN: Primary | ICD-10-CM

## 2023-12-01 PROCEDURE — 97140 MANUAL THERAPY 1/> REGIONS: CPT | Mod: GP | Performed by: PHYSICAL THERAPIST

## 2023-12-01 PROCEDURE — 97110 THERAPEUTIC EXERCISES: CPT | Mod: GP | Performed by: PHYSICAL THERAPIST

## 2023-12-01 PROCEDURE — 97112 NEUROMUSCULAR REEDUCATION: CPT | Mod: GP | Performed by: PHYSICAL THERAPIST

## 2023-12-08 ENCOUNTER — THERAPY VISIT (OUTPATIENT)
Dept: PHYSICAL THERAPY | Facility: CLINIC | Age: 23
End: 2023-12-08
Payer: OTHER MISCELLANEOUS

## 2023-12-08 DIAGNOSIS — M25.571 ACUTE RIGHT ANKLE PAIN: Primary | ICD-10-CM

## 2023-12-08 PROCEDURE — 97140 MANUAL THERAPY 1/> REGIONS: CPT | Mod: GP | Performed by: PHYSICAL THERAPIST

## 2023-12-08 PROCEDURE — 97110 THERAPEUTIC EXERCISES: CPT | Mod: GP | Performed by: PHYSICAL THERAPIST

## 2023-12-08 PROCEDURE — 97112 NEUROMUSCULAR REEDUCATION: CPT | Mod: GP | Performed by: PHYSICAL THERAPIST

## 2023-12-29 ENCOUNTER — THERAPY VISIT (OUTPATIENT)
Dept: PHYSICAL THERAPY | Facility: CLINIC | Age: 23
End: 2023-12-29
Payer: OTHER MISCELLANEOUS

## 2023-12-29 DIAGNOSIS — M25.571 ACUTE RIGHT ANKLE PAIN: Primary | ICD-10-CM

## 2023-12-29 PROCEDURE — 97140 MANUAL THERAPY 1/> REGIONS: CPT | Mod: GP | Performed by: PHYSICAL THERAPIST

## 2023-12-29 PROCEDURE — 97110 THERAPEUTIC EXERCISES: CPT | Mod: GP | Performed by: PHYSICAL THERAPIST

## 2024-01-12 ENCOUNTER — THERAPY VISIT (OUTPATIENT)
Dept: PHYSICAL THERAPY | Facility: CLINIC | Age: 24
End: 2024-01-12
Payer: OTHER MISCELLANEOUS

## 2024-01-12 DIAGNOSIS — M25.571 ACUTE RIGHT ANKLE PAIN: Primary | ICD-10-CM

## 2024-01-12 PROCEDURE — 97140 MANUAL THERAPY 1/> REGIONS: CPT | Mod: GP | Performed by: PHYSICAL THERAPIST

## 2024-01-12 PROCEDURE — 97110 THERAPEUTIC EXERCISES: CPT | Mod: GP | Performed by: PHYSICAL THERAPIST

## 2024-01-29 ENCOUNTER — THERAPY VISIT (OUTPATIENT)
Dept: PHYSICAL THERAPY | Facility: CLINIC | Age: 24
End: 2024-01-29
Payer: OTHER MISCELLANEOUS

## 2024-01-29 DIAGNOSIS — M25.571 ACUTE RIGHT ANKLE PAIN: Primary | ICD-10-CM

## 2024-01-29 PROCEDURE — 97110 THERAPEUTIC EXERCISES: CPT | Mod: GP | Performed by: PHYSICAL THERAPIST

## 2024-01-29 PROCEDURE — 97112 NEUROMUSCULAR REEDUCATION: CPT | Mod: GP | Performed by: PHYSICAL THERAPIST

## 2024-01-29 NOTE — PROGRESS NOTES
01/29/24 0500   Appointment Info   Signing clinician's name / credentials Cecilia Melgarar PT [facility]   Total/Authorized Visits E+T 6   Visits Used 6   Medical Diagnosis right ankle pain   Progress Note/Certification   Onset of illness/injury or Date of Surgery 10/09/23   Therapy Frequency 1x/week to 2x/mo per scheduling availability   PT Goal 1   Goal Identifier ambulation   Goal Description amb 1 mile with NL gait   Rationale to maximize safety and independence within the community   Goal Progress able to run at least a mile without ankle pain   Target Date 12/25/23   Date Met 01/29/24   Subjective Report   Subjective Report doing better. 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 Tolerating  sudden movements better.  Has returned to running on treadmill   Objective Measures   Objective Measures Objective Measure 1   Objective Measure 1   Objective Measure Right ankle ROM and strength  WNL.  Able to maintain single leg stance 30 sec.   Treatment Interventions (PT)   Interventions Therapeutic Procedure/Exercise;Neuromuscular Re-education   Therapeutic Procedure/Exercise   Therapeutic Procedures: strength, endurance, ROM, flexibillity minutes (92946) 20   Ther Proc 1 warm up  sta bike SH 4 5 min   PTRx Ther Proc 1 Walking Lunges With and Without Rotation   PTRx Ther Proc 1 - Details doing better. Has been having to van student at work with less pain. Tolerated sudden movements better   PTRx Ther Proc 2 Functional Lunge Forward   PTRx Ther Proc 2 - Details 5 working up to 10 with each leg as toleratedyou can progress to lunge on to foam block when tolerated   PTRx Ther Proc 3 Self Talocrural Joint Mobility with Band   PTRx Ther Proc 3 - Details as tolerated   PTRx Ther Proc 4 Side Stepping With Theraband   PTRx Ther Proc 4 - Details to fatigue with red thera band at ankles   PTRx Ther Proc 5 Squat   PTRx Ther Proc 5 - Details cueing for equal WB R to L toes straight ahead sitting into hips with knees above  ankles   PTRx Ther Proc 6 Theraband Ankle Dorsiflexion   PTRx Ther Proc 6 - Details 10x with green theraband   PTRx Ther Proc 7 Theraband Ankle Inversion   PTRx Ther Proc 7 - Details progressed to green thera band   PTRx Ther Proc 8 Theraband Diagonal Peroneals   PTRx Ther Proc 8 - Details progressed to green thera band   PTRx Ther Proc 9 Toe Raises   PTRx Ther Proc 9 - Details 10-20x twice a daywill progress to single leg as tolerated   PTRx Ther Proc 10 Standing Gastroc Stretch   PTRx Ther Proc 10 - Details 3x twice a day with 20 sec holdcueing to prevent out toeing   Skilled Intervention for increased ankle strength and functional ROM in DF   Patient Response/Progress tolerated well   PTRx Ther Proc 11 Standing Soleus Stretch   PTRx Ther Proc 11 - Details 3x twice a day with 20 sec holdcueing to prevent out toeing   Neuromuscular Re-education   Neuromuscular re-ed of mvmt, balance, coord, kinesthetic sense, posture, proprioception minutes (47345) 10   Neuromuscular Re-education Neuro Re-ed 2   Neuro Re-ed 1 balalnce   Neuro Re-ed 1 - Details Bosu - SLS, DLS,  (not today)   Neuro Re-ed 2 DLS on wobble board  (not today)   Neuro Re-ed 2 - Details 10x, asymetrical keel to bial WB over R LE   PTRx Neuro Re-ed 1 Balance Single Leg Stance Supported and Unsupported   PTRx Neuro Re-ed 1 - Details work on for 15 sec at a time reaching for support as neededProgress to:   Stand on right leg lift 1-2 lb weight in left hand up and over right shoulder.  Make harder by following weight with eyes.  Repeat 10x. Repeat on other leg.   Patient Response/Progress deferred ex per above due to exacerbation right ankle pain   Manual Therapy   Manual Therapy Manual Therapy 2   Manual Therapy 1 TCJ mobs into DF supine   Manual Therapy 2 MWM into right ankle DF   Manual Therapy 2 - Details right foot up on stool with lunge into DF   Skilled Intervention for less pain with functional DF   Patient Response/Progress less pain with functional  DF after   Education   Learner/Method Patient;Listening;Reading;Demonstration;Pictures/Video;No Barriers to Learning   Plan   Home program per PTRx   Comments   Comments doing much better.  DC   Total Session Time   Timed Code Treatment Minutes 30   Total Treatment Time (sum of timed and untimed services) 30       DISCHARGE  Reason for Discharge: Patient has met all goals.    Equipment Issued: none    Discharge Plan: Patient to continue home program.    Referring Provider:  Kirill Walker

## 2024-02-04 ENCOUNTER — MYC REFILL (OUTPATIENT)
Dept: FAMILY MEDICINE | Facility: CLINIC | Age: 24
End: 2024-02-04
Payer: COMMERCIAL

## 2024-02-04 DIAGNOSIS — F90.0 ATTENTION DEFICIT HYPERACTIVITY DISORDER (ADHD), PREDOMINANTLY INATTENTIVE TYPE: ICD-10-CM

## 2024-02-05 RX ORDER — METHYLPHENIDATE HYDROCHLORIDE 54 MG/1
54 TABLET ORAL EVERY MORNING
Qty: 30 TABLET | Refills: 0 | Status: SHIPPED | OUTPATIENT
Start: 2024-02-05 | End: 2024-03-06

## 2024-02-29 ENCOUNTER — OFFICE VISIT (OUTPATIENT)
Dept: FAMILY MEDICINE | Facility: CLINIC | Age: 24
End: 2024-02-29
Payer: COMMERCIAL

## 2024-02-29 VITALS
SYSTOLIC BLOOD PRESSURE: 116 MMHG | WEIGHT: 212 LBS | DIASTOLIC BLOOD PRESSURE: 76 MMHG | TEMPERATURE: 98.4 F | OXYGEN SATURATION: 100 % | RESPIRATION RATE: 16 BRPM | HEART RATE: 75 BPM | BODY MASS INDEX: 31.4 KG/M2

## 2024-02-29 DIAGNOSIS — J02.9 SORE THROAT: Primary | ICD-10-CM

## 2024-02-29 DIAGNOSIS — B34.9 VIRAL SYNDROME: ICD-10-CM

## 2024-02-29 LAB
DEPRECATED S PYO AG THROAT QL EIA: NEGATIVE
GROUP A STREP BY PCR: NOT DETECTED
SARS-COV-2 RNA RESP QL NAA+PROBE: NEGATIVE

## 2024-02-29 PROCEDURE — 87635 SARS-COV-2 COVID-19 AMP PRB: CPT | Performed by: FAMILY MEDICINE

## 2024-02-29 PROCEDURE — 87651 STREP A DNA AMP PROBE: CPT | Performed by: FAMILY MEDICINE

## 2024-02-29 PROCEDURE — 99213 OFFICE O/P EST LOW 20 MIN: CPT | Performed by: FAMILY MEDICINE

## 2024-02-29 NOTE — PROGRESS NOTES
OUTPATIENT VISIT NOTE                                                   Date of Visit: 2/29/2024     Chief Complaint   Patient presents with:  Throat Problem: Sore throat hard to swallow and headache x 3 days. Strange headaches.             History of Present Illness   Landon Thompson is a 23 year old male c/o sore throat with problems swallowing on the left side of throat for the last three days.  Has had headaches for about a week.  Had some abdominal pain four days ago.  Had some diarrhea.    Tired.  Shaking chills.  No known fever.  Mild ear pain.  No stuffy nose with this.  Cough, nonproductive.  No shortness of breath   Dayquil yesterday.  Negative home covid test.    No known specific exposures.       MEDICATIONS   Current Outpatient Medications   Medication    cetirizine-pseudoePHEDrine ER (ZYRTEC-D) 5-120 MG 12 hr tablet    FLUoxetine (PROZAC) 20 MG capsule    methylphenidate HCl ER, OSM, (CONCERTA) 54 MG CR tablet     No current facility-administered medications for this visit.         SOCIAL HISTORY   Social History     Tobacco Use    Smoking status: Never     Passive exposure: Never    Smokeless tobacco: Never   Substance Use Topics    Alcohol use: Yes     Comment: occ           Physical Exam   Vitals:    02/29/24 1258   BP: 116/76   Pulse: 75   Resp: 16   Temp: 98.4  F (36.9  C)   SpO2: 100%   Weight: 96.2 kg (212 lb)        GENERAL:   Alert. Oriented.  EYES: Clear  HENT:  Ears: R TM pearly gray. Normal landmarks. L TM pearly gray.  Normal landmarks  Nose: Clear.  Sinuses: Nontender.  Oropharynx:  No erythema. No exudate.  NECK: Supple. No adenopathy.  LUNGS: Clear to ascultation.  No crackles.  No wheezing  HEART: RRR  SKIN:  No rash.      Diagnostic Studies   LABS:  Results for orders placed or performed in visit on 02/29/24   Streptococcus A Rapid Screen w/Reflex to PCR - Clinic Collect     Status: Normal    Specimen: Throat; Swab   Result Value Ref Range    Group A Strep antigen Negative Negative             Assessment and Plan     Sore throat    - Streptococcus A Rapid Screen w/Reflex to PCR - Clinic Collect  - Group A Streptococcus PCR Throat Swab  - Symptomatic COVID-19 Virus (Coronavirus) by PCR    Viral syndrome     Pseudoephdrine as needed for congestion.  Oxymetozaline nasal spray as needed for congestion no longer than 3 days.  Acetominaphen or ibuprofen as needed for pain or fever.  Tylenol two tablets--1000 mg and ibuprofen two tablets--400 mg--every 6 hours for throat pain.  Dextromethorphan as needed for cough.  Saline nasal spray.  Warm moist compresses to face.  Warm humidified air.  F/U if worsening or not improving.                   Discussed signs / symptoms that warrant urgent / emergent medical attention.   Recheck if worsening or not improving.       Blas Helton MD          Pertinent History     The following portions of the patient's history were reviewed and updated as appropriate: allergies, current medications, past family history, past medical history, past social history, past surgical history and problem list.

## 2024-02-29 NOTE — PATIENT INSTRUCTIONS
Pseudoephdrine as needed for congestion.  Oxymetozaline nasal spray as needed for congestion no longer than 3 days.  Acetominaphen or ibuprofen as needed for pain or fever.  Tylenol two tablets--1000 mg and ibuprofen two tablets--400 mg--every 6 hours for throat pain.  Dextromethorphan as needed for cough.  Saline nasal spray.  Warm moist compresses to face.  Warm humidified air.  F/U if worsening or not improving.

## 2024-02-29 NOTE — LETTER
February 29, 2024      Landon DREW Jay  [address] [place] [address]        To Whom It May Concern:    Landon Thompson  was seen on 2/29/2024 .  Please excuse him  until 3/4/2024 due to illness.        Sincerely,        Blas Helton MD

## 2024-03-06 ENCOUNTER — MYC REFILL (OUTPATIENT)
Dept: FAMILY MEDICINE | Facility: CLINIC | Age: 24
End: 2024-03-06
Payer: COMMERCIAL

## 2024-03-06 DIAGNOSIS — F90.0 ATTENTION DEFICIT HYPERACTIVITY DISORDER (ADHD), PREDOMINANTLY INATTENTIVE TYPE: ICD-10-CM

## 2024-03-06 RX ORDER — METHYLPHENIDATE HYDROCHLORIDE 54 MG/1
54 TABLET ORAL EVERY MORNING
Qty: 30 TABLET | Refills: 0 | Status: SHIPPED | OUTPATIENT
Start: 2024-03-06 | End: 2024-04-08

## 2024-04-08 ENCOUNTER — MYC REFILL (OUTPATIENT)
Dept: FAMILY MEDICINE | Facility: CLINIC | Age: 24
End: 2024-04-08
Payer: COMMERCIAL

## 2024-04-08 DIAGNOSIS — F90.0 ATTENTION DEFICIT HYPERACTIVITY DISORDER (ADHD), PREDOMINANTLY INATTENTIVE TYPE: ICD-10-CM

## 2024-04-08 RX ORDER — METHYLPHENIDATE HYDROCHLORIDE 54 MG/1
54 TABLET ORAL EVERY MORNING
Qty: 30 TABLET | Refills: 0 | Status: SHIPPED | OUTPATIENT
Start: 2024-04-08 | End: 2024-05-02

## 2024-05-02 ENCOUNTER — MYC REFILL (OUTPATIENT)
Dept: FAMILY MEDICINE | Facility: CLINIC | Age: 24
End: 2024-05-02
Payer: COMMERCIAL

## 2024-05-02 DIAGNOSIS — F90.0 ATTENTION DEFICIT HYPERACTIVITY DISORDER (ADHD), PREDOMINANTLY INATTENTIVE TYPE: ICD-10-CM

## 2024-05-03 RX ORDER — METHYLPHENIDATE HYDROCHLORIDE 54 MG/1
54 TABLET ORAL EVERY MORNING
Qty: 30 TABLET | Refills: 0 | Status: SHIPPED | OUTPATIENT
Start: 2024-05-03 | End: 2024-06-09

## 2024-05-04 ENCOUNTER — OFFICE VISIT (OUTPATIENT)
Dept: URGENT CARE | Facility: URGENT CARE | Age: 24
End: 2024-05-04
Payer: COMMERCIAL

## 2024-05-04 VITALS
OXYGEN SATURATION: 97 % | SYSTOLIC BLOOD PRESSURE: 128 MMHG | TEMPERATURE: 98.2 F | DIASTOLIC BLOOD PRESSURE: 84 MMHG | HEART RATE: 82 BPM | RESPIRATION RATE: 20 BRPM

## 2024-05-04 DIAGNOSIS — R07.0 THROAT PAIN: ICD-10-CM

## 2024-05-04 DIAGNOSIS — J36 TONSIL, ABSCESS: Primary | ICD-10-CM

## 2024-05-04 LAB
DEPRECATED S PYO AG THROAT QL EIA: NEGATIVE
GROUP A STREP BY PCR: NOT DETECTED

## 2024-05-04 PROCEDURE — 87651 STREP A DNA AMP PROBE: CPT | Performed by: NURSE PRACTITIONER

## 2024-05-04 PROCEDURE — 99213 OFFICE O/P EST LOW 20 MIN: CPT | Performed by: NURSE PRACTITIONER

## 2024-05-04 NOTE — PROGRESS NOTES
Assessment & Plan     1. Throat pain  Pending strep culture    - Streptococcus A Rapid Screen w/Reflex to PCR - Clinic Collect  - Group A Streptococcus PCR Throat Swab    2. Tonsil, abscess  Will treat for abscess concern there is no uvular shift small area on left tonsil swelling, white patch that has erythema borders, cervical lymphedema, no fevers. Home care reviewed. Patient verbalized understanding; will monitor symptoms closely. Reviewed s/e to medications.   Follow up with primary care in 1 week if symptoms not improving.     Handout given from epic and reviewed.    - amoxicillin-clavulanate (AUGMENTIN) 875-125 MG tablet; Take 1 tablet by mouth 2 times daily for 10 days  Dispense: 20 tablet; Refill: 0    SHY Esparza [facility] URGENT CARE TRAM Navarrete is a 23 year old male who presents to clinic today for the following health issues:  Chief Complaint   Patient presents with    Swelling     Neck lymph nodes    Fatigue    Pharyngitis     HPI      Patient presents to clinic states for the past 7 days he has had sore throat  it is waxing and waning in severity he has been trying to treat symptoms with over-the-counter medications such as throat lozenges, gargling with warm salt water, Tylenol ibuprofen.  States he has had strep multiple times in past.  Denies difficulty swallowing, nausea vomiting, mild cough nonproductive.  Negative for fevers at home.  States symptoms are not improving.    Review of Systems  Constitutional, HEENT, cardiovascular, pulmonary, gi and gu systems are negative, except as otherwise noted.      Objective    /84   Pulse 82   Temp 98.2  F (36.8  C)   Resp 20   SpO2 97%   Physical Exam   GENERAL: alert and no distress  EYES: Eyes grossly normal to inspection, PERRL and conjunctivae and sclerae normal  HENT: normal cephalic/atraumatic, ear canals and TM's normal, nose and mouth without ulcers or lesions, oropharynx clear, oral mucous  membranes moist, tonsillar hypertrophy, tonsillar erythema, and tonsillar exudate  NECK: bilateral anterior cervical adenopathy, no asymmetry, masses, or scars, and thyroid normal to palpation  RESP: lungs clear to auscultation - no rales, rhonchi or wheezes  CV: regular rate and rhythm, normal S1 S2, no S3 or S4, no murmur, click or rub, no peripheral edema  ABDOMEN: soft, nontender, no hepatosplenomegaly, no masses and bowel sounds normal  MS: no gross musculoskeletal defects noted, no edema  SKIN: no suspicious lesions or rashes    Results for orders placed or performed in visit on 05/04/24   Streptococcus A Rapid Screen w/Reflex to PCR - Clinic Collect     Status: Normal    Specimen: Throat; Swab   Result Value Ref Range    Group A Strep antigen Negative Negative

## 2024-06-09 ENCOUNTER — MYC REFILL (OUTPATIENT)
Dept: FAMILY MEDICINE | Facility: CLINIC | Age: 24
End: 2024-06-09

## 2024-06-09 DIAGNOSIS — F90.0 ATTENTION DEFICIT HYPERACTIVITY DISORDER (ADHD), PREDOMINANTLY INATTENTIVE TYPE: ICD-10-CM

## 2024-06-10 RX ORDER — METHYLPHENIDATE HYDROCHLORIDE 54 MG/1
54 TABLET ORAL EVERY MORNING
Qty: 30 TABLET | Refills: 0 | Status: SHIPPED | OUTPATIENT
Start: 2024-06-10 | End: 2024-07-11

## 2024-07-01 ENCOUNTER — ANCILLARY PROCEDURE (OUTPATIENT)
Dept: GENERAL RADIOLOGY | Facility: CLINIC | Age: 24
End: 2024-07-01
Attending: NURSE PRACTITIONER
Payer: OTHER MISCELLANEOUS

## 2024-07-01 ENCOUNTER — OFFICE VISIT (OUTPATIENT)
Dept: URGENT CARE | Facility: URGENT CARE | Age: 24
End: 2024-07-01
Payer: OTHER MISCELLANEOUS

## 2024-07-01 VITALS
RESPIRATION RATE: 18 BRPM | SYSTOLIC BLOOD PRESSURE: 128 MMHG | DIASTOLIC BLOOD PRESSURE: 77 MMHG | HEART RATE: 96 BPM | OXYGEN SATURATION: 99 %

## 2024-07-01 DIAGNOSIS — M25.571 PAIN IN JOINT INVOLVING ANKLE AND FOOT, RIGHT: ICD-10-CM

## 2024-07-01 DIAGNOSIS — M25.571 PAIN IN JOINT INVOLVING ANKLE AND FOOT, RIGHT: Primary | ICD-10-CM

## 2024-07-01 PROCEDURE — 73610 X-RAY EXAM OF ANKLE: CPT | Mod: TC | Performed by: RADIOLOGY

## 2024-07-01 PROCEDURE — 99213 OFFICE O/P EST LOW 20 MIN: CPT | Performed by: NURSE PRACTITIONER

## 2024-07-01 NOTE — PATIENT INSTRUCTIONS
Results for orders placed or performed in visit on 07/01/24   XR Ankle Right G/E 3 Views     Status: None    Narrative    XR ANKLE RIGHT G/E 3 VIEWS   7/1/2024 4:04 PM     HISTORY:  Pain in joint involving ankle and foot, right    Comparison: Radiographs from 11/9/2023.      Impression    IMPRESSION: Normal.    MICHAEL SALDAÑA MD         SYSTEM ID:  [id number]       The radiologist did not find anything abnormal on your xrays.   Rest the affected painful area as much as possible.    Apply ice for 15-20 minutes intermittently as needed and especially after any offending activity.   Daily stretching.  Ibuprofen as needed.    As pain recedes, begin normal activities slowly as tolerated.    Consider Physical Therapy if symptoms not better with symptomatic care.

## 2024-07-01 NOTE — PROGRESS NOTES
Assessment & Plan     Pain in joint involving ankle and foot, right    - XR Ankle Right G/E 3 Views     Patient Instructions     Results for orders placed or performed in visit on 07/01/24   XR Ankle Right G/E 3 Views     Status: None    Narrative    XR ANKLE RIGHT G/E 3 VIEWS   7/1/2024 4:04 PM     HISTORY:  Pain in joint involving ankle and foot, right    Comparison: Radiographs from 11/9/2023.      Impression    IMPRESSION: Normal.    MICHAEL SALDAÑA MD         SYSTEM ID:  [id number]       The radiologist did not find anything abnormal on your xrays.   Rest the affected painful area as much as possible.    Apply ice for 15-20 minutes intermittently as needed and especially after any offending activity.   Daily stretching.  Ibuprofen as needed.    As pain recedes, begin normal activities slowly as tolerated.    Consider Physical Therapy if symptoms not better with symptomatic care.              Return in about 1 week (around 7/8/2024) for with regular provider if symptoms persist.    SHY Seo [facility] URGENT CARE TRAM Navarrete is a 23 year old male who presents to clinic today for the following health issues:  Chief Complaint   Patient presents with    Ankle Pain     Twisted ankle on wood chips at work-R ankle-injured this ankle back in Oct as well     HPI    MS Injury/Pain    Onset of symptoms was 1 hour(s) ago.  Location: right ankle  Context:       The injury happened while at work and while walking      Mechanism: twisting, near-fall      Patient experienced immediate pain, was able to bear weight directly after injury, no deformity was noted by the patient  Course of symptoms is same.    Severity moderate  Current and Associated symptoms: Pain, Swelling, and Stiffness  Denies  Bruising, Warmth, and Redness  Aggravating Factors: walking, weight-bearing, exertion, and flexion/extension  Therapies to improve symptoms include: ice and ibuprofen  This is not the first  time this type of problem has occurred for this patient.   Similar injury 6 months ago, had PT and improved.      Review of Systems  Constitutional, HEENT, cardiovascular, pulmonary, GI, , musculoskeletal, neuro, skin, endocrine and psych systems are negative, except as otherwise noted.      Objective    /77   Pulse 96   Resp 18   SpO2 99%   Physical Exam   GENERAL: alert and no distress  RESP: lungs clear to auscultation - no rales, rhonchi or wheezes  CV: regular rate and rhythm, normal S1 S2, no S3 or S4, no murmur, click or rub, no peripheral edema  MS: right lateral malleolus edema, no ecchymosis or erythema.

## 2024-07-03 ENCOUNTER — OFFICE VISIT (OUTPATIENT)
Dept: FAMILY MEDICINE | Facility: CLINIC | Age: 24
End: 2024-07-03
Payer: OTHER MISCELLANEOUS

## 2024-07-03 VITALS
OXYGEN SATURATION: 97 % | DIASTOLIC BLOOD PRESSURE: 77 MMHG | HEART RATE: 111 BPM | RESPIRATION RATE: 20 BRPM | HEIGHT: 69 IN | SYSTOLIC BLOOD PRESSURE: 122 MMHG | TEMPERATURE: 98.8 F | BODY MASS INDEX: 29.47 KG/M2 | WEIGHT: 199 LBS

## 2024-07-03 DIAGNOSIS — S93.401D SPRAIN OF RIGHT ANKLE, UNSPECIFIED LIGAMENT, SUBSEQUENT ENCOUNTER: Primary | ICD-10-CM

## 2024-07-03 PROCEDURE — 99213 OFFICE O/P EST LOW 20 MIN: CPT | Performed by: PHYSICIAN ASSISTANT

## 2024-07-03 ASSESSMENT — PAIN SCALES - GENERAL: PAINLEVEL: NO PAIN (0)

## 2024-07-03 ASSESSMENT — PATIENT HEALTH QUESTIONNAIRE - PHQ9
SUM OF ALL RESPONSES TO PHQ QUESTIONS 1-9: 9
SUM OF ALL RESPONSES TO PHQ QUESTIONS 1-9: 9
10. IF YOU CHECKED OFF ANY PROBLEMS, HOW DIFFICULT HAVE THESE PROBLEMS MADE IT FOR YOU TO DO YOUR WORK, TAKE CARE OF THINGS AT HOME, OR GET ALONG WITH OTHER PEOPLE: SOMEWHAT DIFFICULT

## 2024-07-03 NOTE — PROGRESS NOTES
Assessment and Plan:     (S99.457Q) Sprain of right ankle, unspecified ligament, subsequent encounter  (primary encounter diagnosis)  Comment: turned ankle while walking on wood chips at work, has pain and swelling laterally, initially was seen at  and XR negative, this is second injury to same ankle over last year,  job is physical, needs to be able to walk/run  Plan: Orthopedic  Referral        Filled out workability form today, recommend seated work only  Ice, elevate, compression with ACE wrap  Follow-up with ortho for right ankle instability, repeat injury    NATASHA [facility] Same Day Provider         Subjective   Landon is a 23 year old, presenting for the following health issues:  Forms (Workability )    History of Present Illness       Reason for visit:  Foot injury workers comp  Symptom onset:  1-3 days ago  Symptoms include:  Pain when standing flat, pain when moving faster than slowish walk  Symptom intensity:  Moderate  Symptom progression:  Improving  Had these symptoms before:  Yes  Has tried/received treatment for these symptoms:  Yes  Previous treatment was successful:  Yes  Prior treatment description:  PT  What makes it worse:  [place], Squatting, stretching foot  What makes it better:  Certain shoes, ice    He eats 2-3 servings of fruits and vegetables daily.He consumes 1 sweetened beverage(s) daily.He exercises with enough effort to increase his heart rate 30 to 60 minutes per day.  He exercises with enough effort to increase his heart rate 4 days per week. He is missing 7 dose(s) of medications per week.     8 months ago Landon hurt his right ankle while at work ([employer])  He went to PT, took about a month to recover then went back to work w/out restrictions     On Monday, two days, he was at work and walked onto wood chips and turned ankle laterally  He was seen at  and had XR which were negative for fracture   Now continues to have pain and swelling   He cannot  "run or stand for long periods of time   Needs workability form         Objective    /77 (BP Location: Left arm, Patient Position: Sitting, Cuff Size: Adult Large)   Pulse 111   Temp 98.8  F (37.1  C) (Oral)   Resp 20   Ht 1.75 m (5' 8.9\")   Wt 90.3 kg (199 lb)   SpO2 97%   BMI 29.47 kg/m    Body mass index is 29.47 kg/m .    Physical Exam     GENERAL: healthy, alert and no distress  MS: extremities- right ankle with swelling and tenderness over lateral malleolus, no erythema, pain with ROM, able to bear weight but painful             Signed Electronically by: Kimberly Cobian PA-C    "

## 2024-07-11 ENCOUNTER — ANCILLARY PROCEDURE (OUTPATIENT)
Dept: GENERAL RADIOLOGY | Facility: CLINIC | Age: 24
End: 2024-07-11
Attending: PODIATRIST
Payer: OTHER MISCELLANEOUS

## 2024-07-11 ENCOUNTER — MYC REFILL (OUTPATIENT)
Dept: FAMILY MEDICINE | Facility: CLINIC | Age: 24
End: 2024-07-11

## 2024-07-11 ENCOUNTER — OFFICE VISIT (OUTPATIENT)
Dept: PODIATRY | Facility: CLINIC | Age: 24
End: 2024-07-11
Attending: PHYSICIAN ASSISTANT
Payer: OTHER MISCELLANEOUS

## 2024-07-11 VITALS — DIASTOLIC BLOOD PRESSURE: 64 MMHG | BODY MASS INDEX: 29.47 KG/M2 | WEIGHT: 199 LBS | SYSTOLIC BLOOD PRESSURE: 119 MMHG

## 2024-07-11 DIAGNOSIS — S93.401D SPRAIN OF RIGHT ANKLE, UNSPECIFIED LIGAMENT, SUBSEQUENT ENCOUNTER: ICD-10-CM

## 2024-07-11 DIAGNOSIS — F90.0 ATTENTION DEFICIT HYPERACTIVITY DISORDER (ADHD), PREDOMINANTLY INATTENTIVE TYPE: ICD-10-CM

## 2024-07-11 PROCEDURE — 99203 OFFICE O/P NEW LOW 30 MIN: CPT | Performed by: PODIATRIST

## 2024-07-11 PROCEDURE — 73600 X-RAY EXAM OF ANKLE: CPT | Mod: TC | Performed by: RADIOLOGY

## 2024-07-11 RX ORDER — METHYLPHENIDATE HYDROCHLORIDE 54 MG/1
54 TABLET ORAL EVERY MORNING
Qty: 30 TABLET | Refills: 0 | Status: SHIPPED | OUTPATIENT
Start: 2024-07-11 | End: 2024-08-01

## 2024-07-11 NOTE — PATIENT INSTRUCTIONS
Thank you for choosing [facility] / Foot & Ankle Surgery!    DR. FRANKLIN'S CLINIC LOCATIONS:     [facility] (Friday) TRIAGE LINE: [phone]   [address] [facility]  APPOINTMENTS: [phone]   [facility] [address] [address] RADIOLOGY: [phone]    PHYSICAL THERAPY: [phone]    SET UP SURGERY: [phone]   [place] (Mon-Tues AM-Thurs) BILLING QUESTIONS: [phone]   [address] FAX: [phone]   [facility] [place] [address] [facility]: [phone]

## 2024-07-11 NOTE — LETTER
"7/11/2024      Landon Thompson  [address] [place] [address]      Dear Colleague,    Thank you for referring your patient, Landon Thompson, to the [facility]. Please see a copy of my visit note below.    Foot & Ankle Surgery  July 11, 2024    CC: \"Injury to ankle\"    I was asked to see Landon Thompson regarding the chief complaint by:  STUART Cobian PA-C    HPI:  Pt is a 23 year old male who presents with above complaint.  The patient was in the ER on 10/9/2023 for a work-related right ankle injury.  X-rays were negative.  He was then seen in urgent care on 7/1/2024 for a repeat right ankle injury.  He did physical therapy after the initial October injury, it \"helped a lot\".  He has been doing his home physical therapy exercises since the July injury but the ankle feels like it is \"cramping, locking\".  \"The swelling has gone down\".  \"Going up stairs still hurts\" from the original injury.  He states the ankle \"felt like it was about to fall out of place\" after the injuries.    ROS:   Pos for CC.  The patient denies current nausea, vomiting, chills, fevers, belly pain, calf pain, chest pain or SOB.  Complete remainder of ROS is otherwise neg.    VITALS:    Vitals:    07/11/24 0802   BP: 119/64   Weight: 90.3 kg (199 lb)       PMH:    Past Medical History:   Diagnosis Date     Depressive disorder        SXHX:    Past Surgical History:   Procedure Laterality Date     WISDOM TOOTH EXTRACTION          MEDS:    Current Outpatient Medications   Medication Sig Dispense Refill     cetirizine-pseudoePHEDrine ER (ZYRTEC-D) 5-120 MG 12 hr tablet Take 1 tablet by mouth 2 times daily 28 tablet 0     FLUoxetine (PROZAC) 20 MG capsule Take 20 mg by mouth daily       methylphenidate HCl ER, OSM, (CONCERTA) 54 MG CR tablet Take 1 tablet (54 mg) by mouth every morning 30 tablet 0     No current facility-administered medications for this visit.       ALL:     Allergies   Allergen Reactions     No " Known Allergies        FMH:  No family history on file.    SocHx:    Social History     Socioeconomic History     Marital status: Single     Spouse name: Not on file     Number of children: Not on file     Years of education: Not on file     Highest education level: Not on file   Occupational History     Not on file   Tobacco Use     Smoking status: Never     Passive exposure: Never     Smokeless tobacco: Never   Vaping Use     Vaping status: Not on file   Substance and Sexual Activity     Alcohol use: Yes     Comment: occ     Drug use: Never     Sexual activity: Yes     Partners: Female     Birth control/protection: Pull-out method, Condom, Pill   Other Topics Concern     Parent/sibling w/ CABG, MI or angioplasty before 65F 55M? No   Social History Narrative     Not on file     Social Determinants of Health     Financial Resource Strain: Low Risk  (10/13/2023)    Financial Resource Strain      Within the past 12 months, have you or your family members you live with been unable to get utilities (heat, electricity) when it was really needed?: No   Food Insecurity: Low Risk  (10/13/2023)    Food Insecurity      Within the past 12 months, did you worry that your food would run out before you got money to buy more?: No      Within the past 12 months, did the food you bought just not last and you didn t have money to get more?: No   Transportation Needs: Low Risk  (10/13/2023)    Transportation Needs      Within the past 12 months, has lack of transportation kept you from medical appointments, getting your medicines, non-medical meetings or appointments, work, or from getting things that you need?: No   Physical Activity: Not on file   Stress: Not on file   Social Connections: Not on file   Interpersonal Safety: Low Risk  (7/3/2024)    Interpersonal Safety      Do you feel physically and emotionally safe where you currently live?: Yes      Within the past 12 months, have you been hit, slapped, kicked or otherwise  physically hurt by someone?: No      Within the past 12 months, have you been humiliated or emotionally abused in other ways by your partner or ex-partner?: No   Housing Stability: Low Risk  (10/13/2023)    Housing Stability      Do you have housing? : Yes      Are you worried about losing your housing?: No           EXAMINATION:  Gen:   No apparent distress  Neuro:   A&Ox3, no deficits  Psych:    Answering questions appropriately for age and situation with normal affect  Head:    NCAT  Eye:    Visual scanning without deficit  Ear:    Response to auditory stimuli wnl  Lung:    Non-labored breathing on RA noted  Abd:    NTND per patient report  Lymph:    Neg for pitting/non-pitting edema BLE  Vasc:    Pulses palpable, CFT minimally delayed  Neuro:    Light touch sensation intact to all sensory nerve distributions without paresthesias  Derm:    Neg for nodules, lesions or ulcerations  MSK:    Right lower extremity -the knee to ankle examination is -50 mile tenderness on palpation of the deltoid.  He has increased pain at the medial recess and anterior gutter of the ankle joint as well as pain at the anterior talofibular ligament and to a lesser extent the calcaneofibular ligament.  Talar tilt and anterior drawer appear to show subluxation of the joint and are painful  Calf:    Neg for redness, swelling or tenderness      Imaging: Nonweightbearing x-rays right ankle 7/1/2024 - IMPRESSION: Normal.     Stress x-rays right ankle 7/11/2024 - IMPRESSION: On these stress views, there is moderate-to-marked varus  angulation at the tibiotalar joint and there is moderate anterior  subluxation of the talus in relation to the tibial plafond, all new  since the prior study. There is no evidence of fracture or talar dome  osteochondral lesion      Assessment:  23 year old male with 2 significant right ankle work comp injuries with gross instability seen on stress x-rays      Medical Decision Making/Plan:  Discussed etiologies,  anatomy and options  1.  History of multiple work related right ankle injuries with involvement of ATFL and CFL with instability noted on stress x-rays  -I personally reviewed and interpreted the patient's lower extremity history pertinent to today's visit, including imaging/labs, in preparation for initiating a treatment program.  -I personally performed and interpreted/reviewed the stress x-rays  -An ankle brace was dispensed.  -The patient is scheduled to return to work on 7/19/2024.  He indicates his job requires him to be on his feet, including running.  He feels he can do this with an ankle brace  -Based on stress x-rays and history of injuries, an MRI was ordered for further assessment.  He will follow-up afterwards to review the results.  We discussed that with instability seen on stress x-rays, physical therapy is likely insufficient and he will likely require surgical stabilization of his ankle          Follow up: After MRI or sooner with acute issues      Patient's medical history was reviewed today      Brian Mcgill DPM FACFAS FACFAOM  Podiatric Foot & Ankle Surgeon  [facility]  [phone]    Disclaimer: This note consists of symbols derived from keyboarding, dictation and/or voice recognition software. As a result, there may be errors in the script that have gone undetected. Please consider this when interpreting information found in this chart.          Again, thank you for allowing me to participate in the care of your patient.        Sincerely,        Brian Mcgill DPM, YOLETTE

## 2024-07-11 NOTE — PROGRESS NOTES
"Foot & Ankle Surgery  July 11, 2024    CC: \"Injury to ankle\"    I was asked to see Landon Thompson regarding the chief complaint by:  STUART Cobian PA-C    HPI:  Pt is a 23 year old male who presents with above complaint.  The patient was in the ER on 10/9/2023 for a work-related right ankle injury.  X-rays were negative.  He was then seen in urgent care on 7/1/2024 for a repeat right ankle injury.  He did physical therapy after the initial October injury, it \"helped a lot\".  He has been doing his home physical therapy exercises since the July injury but the ankle feels like it is \"cramping, locking\".  \"The swelling has gone down\".  \"Going up stairs still hurts\" from the original injury.  He states the ankle \"felt like it was about to fall out of place\" after the injuries.    ROS:   Pos for CC.  The patient denies current nausea, vomiting, chills, fevers, belly pain, calf pain, chest pain or SOB.  Complete remainder of ROS is otherwise neg.    VITALS:    Vitals:    07/11/24 0802   BP: 119/64   Weight: 90.3 kg (199 lb)       PMH:    Past Medical History:   Diagnosis Date    Depressive disorder        SXHX:    Past Surgical History:   Procedure Laterality Date    WISDOM TOOTH EXTRACTION          MEDS:    Current Outpatient Medications   Medication Sig Dispense Refill    cetirizine-pseudoePHEDrine ER (ZYRTEC-D) 5-120 MG 12 hr tablet Take 1 tablet by mouth 2 times daily 28 tablet 0    FLUoxetine (PROZAC) 20 MG capsule Take 20 mg by mouth daily      methylphenidate HCl ER, OSM, (CONCERTA) 54 MG CR tablet Take 1 tablet (54 mg) by mouth every morning 30 tablet 0     No current facility-administered medications for this visit.       ALL:     Allergies   Allergen Reactions    No Known Allergies        FMH:  No family history on file.    SocHx:    Social History     Socioeconomic History    Marital status: Single     Spouse name: Not on file    Number of children: Not on file    Years of education: Not on file    Highest " education level: Not on file   Occupational History    Not on file   Tobacco Use    Smoking status: Never     Passive exposure: Never    Smokeless tobacco: Never   Vaping Use    Vaping status: Not on file   Substance and Sexual Activity    Alcohol use: Yes     Comment: occ    Drug use: Never    Sexual activity: Yes     Partners: Female     Birth control/protection: Pull-out method, Condom, Pill   Other Topics Concern    Parent/sibling w/ CABG, MI or angioplasty before 65F 55M? No   Social History Narrative    Not on file     Social Determinants of Health     Financial Resource Strain: Low Risk  (10/13/2023)    Financial Resource Strain     Within the past 12 months, have you or your family members you live with been unable to get utilities (heat, electricity) when it was really needed?: No   Food Insecurity: Low Risk  (10/13/2023)    Food Insecurity     Within the past 12 months, did you worry that your food would run out before you got money to buy more?: No     Within the past 12 months, did the food you bought just not last and you didn t have money to get more?: No   Transportation Needs: Low Risk  (10/13/2023)    Transportation Needs     Within the past 12 months, has lack of transportation kept you from medical appointments, getting your medicines, non-medical meetings or appointments, work, or from getting things that you need?: No   Physical Activity: Not on file   Stress: Not on file   Social Connections: Not on file   Interpersonal Safety: Low Risk  (7/3/2024)    Interpersonal Safety     Do you feel physically and emotionally safe where you currently live?: Yes     Within the past 12 months, have you been hit, slapped, kicked or otherwise physically hurt by someone?: No     Within the past 12 months, have you been humiliated or emotionally abused in other ways by your partner or ex-partner?: No   Housing Stability: Low Risk  (10/13/2023)    Housing Stability     Do you have housing? : Yes     Are you  worried about losing your housing?: No           EXAMINATION:  Gen:   No apparent distress  Neuro:   A&Ox3, no deficits  Psych:    Answering questions appropriately for age and situation with normal affect  Head:    NCAT  Eye:    Visual scanning without deficit  Ear:    Response to auditory stimuli wnl  Lung:    Non-labored breathing on RA noted  Abd:    NTND per patient report  Lymph:    Neg for pitting/non-pitting edema BLE  Vasc:    Pulses palpable, CFT minimally delayed  Neuro:    Light touch sensation intact to all sensory nerve distributions without paresthesias  Derm:    Neg for nodules, lesions or ulcerations  MSK:    Right lower extremity -the knee to ankle examination is -50 mile tenderness on palpation of the deltoid.  He has increased pain at the medial recess and anterior gutter of the ankle joint as well as pain at the anterior talofibular ligament and to a lesser extent the calcaneofibular ligament.  Talar tilt and anterior drawer appear to show subluxation of the joint and are painful  Calf:    Neg for redness, swelling or tenderness      Imaging: Nonweightbearing x-rays right ankle 7/1/2024 - IMPRESSION: Normal.     Stress x-rays right ankle 7/11/2024 - IMPRESSION: On these stress views, there is moderate-to-marked varus  angulation at the tibiotalar joint and there is moderate anterior  subluxation of the talus in relation to the tibial plafond, all new  since the prior study. There is no evidence of fracture or talar dome  osteochondral lesion      Assessment:  23 year old male with 2 significant right ankle work comp injuries with gross instability seen on stress x-rays      Medical Decision Making/Plan:  Discussed etiologies, anatomy and options  1.  History of multiple work related right ankle injuries with involvement of ATFL and CFL with instability noted on stress x-rays  -I personally reviewed and interpreted the patient's lower extremity history pertinent to today's visit, including  imaging/labs, in preparation for initiating a treatment program.  -I personally performed and interpreted/reviewed the stress x-rays  -An ankle brace was dispensed.  -The patient is scheduled to return to work on 7/19/2024.  He indicates his job requires him to be on his feet, including running.  He feels he can do this with an ankle brace  -Based on stress x-rays and history of injuries, an MRI was ordered for further assessment.  He will follow-up afterwards to review the results.  We discussed that with instability seen on stress x-rays, physical therapy is likely insufficient and he will likely require surgical stabilization of his ankle          Follow up: After MRI or sooner with acute issues      Patient's medical history was reviewed today      Brian Mcgill DPM FACFAS FACFAOM  Podiatric Foot & Ankle Surgeon  [facility]  [phone]    Disclaimer: This note consists of symbols derived from keyboarding, dictation and/or voice recognition software. As a result, there may be errors in the script that have gone undetected. Please consider this when interpreting information found in this chart.

## 2024-07-19 ENCOUNTER — MYC MEDICAL ADVICE (OUTPATIENT)
Dept: FAMILY MEDICINE | Facility: CLINIC | Age: 24
End: 2024-07-19
Payer: COMMERCIAL

## 2024-07-19 NOTE — TELEPHONE ENCOUNTER
Pt was seen for workability forms with Kathy 07/03 and podiatry 07/11. Pls advise who can help pt with this request - clearance for work.   Velia Sparks, CMA

## 2024-07-22 ENCOUNTER — VIRTUAL VISIT (OUTPATIENT)
Dept: FAMILY MEDICINE | Facility: CLINIC | Age: 24
End: 2024-07-22
Payer: COMMERCIAL

## 2024-07-22 ENCOUNTER — MYC MEDICAL ADVICE (OUTPATIENT)
Dept: FAMILY MEDICINE | Facility: CLINIC | Age: 24
End: 2024-07-22

## 2024-07-22 DIAGNOSIS — S93.401D SPRAIN OF RIGHT ANKLE, UNSPECIFIED LIGAMENT, SUBSEQUENT ENCOUNTER: Primary | ICD-10-CM

## 2024-07-22 PROCEDURE — 99212 OFFICE O/P EST SF 10 MIN: CPT | Mod: 95 | Performed by: NURSE PRACTITIONER

## 2024-07-22 NOTE — PROGRESS NOTES
Landon is a 23 year old who is being evaluated via a billable video visit.          Assessment & Plan     Sprain of right ankle, unspecified ligament, subsequent encounter  Completed form for pt to return to work without restrictions             Subjective   Landon is a 23 year old, presenting for the following health issues:  No chief complaint on file.    History of Present Illness       Reason for visit:  Return to work/Being cleared to work again  Symptom onset:  3-4 weeks ago  Symptoms include:  Foot injury, pain in ankle  Symptom intensity:  Mild  Symptom progression:  Improving  Had these symptoms before:  Yes  Has tried/received treatment for these symptoms:  Yes  Previous treatment was successful:  Yes  Prior treatment description:  PT  What makes it worse:  Nothing at the moment  What makes it better:  Resting    He eats 2-3 servings of fruits and vegetables daily.He consumes 0 sweetened beverage(s) daily.He exercises with enough effort to increase his heart rate 20 to 29 minutes per day.  He exercises with enough effort to increase his heart rate 4 days per week.   He is taking medications regularly.     Ankle is doing better.   Using an ankle brace   Has been working a desk job in the interim   Wants to go back to work in his normal role       Review of Systems  Detailed as above       Objective           Vitals:  No vitals were obtained today due to virtual visit.    Physical Exam   GENERAL: alert and no distress  EYES: Eyes grossly normal to inspection.  No discharge or erythema, or obvious scleral/conjunctival abnormalities.  RESP: No audible wheeze, cough, or visible cyanosis.    SKIN: Visible skin clear. No significant rash, abnormal pigmentation or lesions.  NEURO: Cranial nerves grossly intact.  Mentation and speech appropriate for age.  PSYCH: Appropriate affect, tone, and pace of words          Video-Visit Details    Type of service:  Video Visit   Originating Location (pt. Location):  Home    Distant Location (provider location):  On-site  Platform used for Video Visit: Ed  Signed Electronically by: SHY Kunz CNP

## 2024-07-23 NOTE — TELEPHONE ENCOUNTER
Form was completed yesterday and was put into the outgoing fax box. Please fax to this new number.

## 2024-07-25 ENCOUNTER — VIRTUAL VISIT (OUTPATIENT)
Dept: FAMILY MEDICINE | Facility: CLINIC | Age: 24
End: 2024-07-25
Payer: COMMERCIAL

## 2024-07-25 DIAGNOSIS — S93.401D SPRAIN OF RIGHT ANKLE, UNSPECIFIED LIGAMENT, SUBSEQUENT ENCOUNTER: Primary | ICD-10-CM

## 2024-07-25 PROCEDURE — 99212 OFFICE O/P EST SF 10 MIN: CPT | Mod: 95 | Performed by: NURSE PRACTITIONER

## 2024-07-25 NOTE — PROGRESS NOTES
"Landon is a 23 year old who is being evaluated via a billable video visit.    How would you like to obtain your AVS? MyChart  If the video visit is dropped, the invitation should be resent by: Text to cell phone: [phone]  Will anyone else be joining your video visit? No      Assessment & Plan     Sprain of right ankle, unspecified ligament, subsequent encounter  Needing new form for work. Tried to go back without restrictions but had too much pain. Form completed with restrictions now until sept 1. Has ortho follow-up scheduled          BMI  Estimated body mass index is 29.47 kg/m  as calculated from the following:    Height as of 7/3/24: 1.75 m (5' 8.9\").    Weight as of 7/11/24: 90.3 kg (199 lb).             Subjective   Landon is a 23 year old, presenting for the following health issues:  Follow Up (Patient is having a virtual follow up visit from yesterday's in office visit.)    HPI     Wanted to return to work without restrictions following an injury.  He did return and realized he was still having pain with some activities  Realized he can't lift more than 20 lbs.  He now needs a new form for restrictions at work  He has ortho followed up scheduled        Review of Systems  Detailed as above       Objective           Vitals:  No vitals were obtained today due to virtual visit.    Physical Exam   GENERAL: alert and no distress  EYES: Eyes grossly normal to inspection.  No discharge or erythema, or obvious scleral/conjunctival abnormalities.  RESP: No audible wheeze, cough, or visible cyanosis.    SKIN: Visible skin clear. No significant rash, abnormal pigmentation or lesions.  NEURO: Cranial nerves grossly intact.  Mentation and speech appropriate for age.  PSYCH: Appropriate affect, tone, and pace of words          Video-Visit Details    Type of service:  Video Visit   Originating Location (pt. Location): Home    Distant Location (provider location):  On-site  Platform used for Video Visit: Ed  Signed " Electronically by: SHY Kunz CNP

## 2024-08-01 ENCOUNTER — MYC MEDICAL ADVICE (OUTPATIENT)
Dept: FAMILY MEDICINE | Facility: CLINIC | Age: 24
End: 2024-08-01
Payer: COMMERCIAL

## 2024-08-01 ENCOUNTER — MYC REFILL (OUTPATIENT)
Dept: FAMILY MEDICINE | Facility: CLINIC | Age: 24
End: 2024-08-01
Payer: COMMERCIAL

## 2024-08-01 DIAGNOSIS — F90.0 ATTENTION DEFICIT HYPERACTIVITY DISORDER (ADHD), PREDOMINANTLY INATTENTIVE TYPE: ICD-10-CM

## 2024-08-01 RX ORDER — METHYLPHENIDATE HYDROCHLORIDE 54 MG/1
54 TABLET ORAL EVERY MORNING
Qty: 30 TABLET | Refills: 0 | Status: SHIPPED | OUTPATIENT
Start: 2024-08-01 | End: 2024-08-22

## 2024-08-01 NOTE — TELEPHONE ENCOUNTER
Outgoing call:    Spoke with patient regarding if he has another fax # we can go ahead fax to his work, which he does not know.     Informed patient that form will be re fax again to [employer] at [phone]. Previously was faxed on 07/25/24.

## 2024-08-02 ENCOUNTER — ANCILLARY PROCEDURE (OUTPATIENT)
Dept: MRI IMAGING | Facility: CLINIC | Age: 24
End: 2024-08-02
Attending: PODIATRIST
Payer: OTHER MISCELLANEOUS

## 2024-08-02 DIAGNOSIS — S93.401D SPRAIN OF RIGHT ANKLE, UNSPECIFIED LIGAMENT, SUBSEQUENT ENCOUNTER: ICD-10-CM

## 2024-08-02 PROCEDURE — 73721 MRI JNT OF LWR EXTRE W/O DYE: CPT | Mod: 26 | Performed by: RADIOLOGY

## 2024-08-02 PROCEDURE — 73721 MRI JNT OF LWR EXTRE W/O DYE: CPT | Mod: RT

## 2024-08-08 ENCOUNTER — ANCILLARY PROCEDURE (OUTPATIENT)
Dept: GENERAL RADIOLOGY | Facility: CLINIC | Age: 24
End: 2024-08-08
Attending: PODIATRIST
Payer: OTHER MISCELLANEOUS

## 2024-08-08 ENCOUNTER — OFFICE VISIT (OUTPATIENT)
Dept: PODIATRY | Facility: CLINIC | Age: 24
End: 2024-08-08
Payer: OTHER MISCELLANEOUS

## 2024-08-08 VITALS — BODY MASS INDEX: 29.47 KG/M2 | SYSTOLIC BLOOD PRESSURE: 118 MMHG | WEIGHT: 199 LBS | DIASTOLIC BLOOD PRESSURE: 68 MMHG

## 2024-08-08 DIAGNOSIS — S93.401D SPRAIN OF RIGHT ANKLE, UNSPECIFIED LIGAMENT, SUBSEQUENT ENCOUNTER: Primary | ICD-10-CM

## 2024-08-08 DIAGNOSIS — S93.401D SPRAIN OF RIGHT ANKLE, UNSPECIFIED LIGAMENT, SUBSEQUENT ENCOUNTER: ICD-10-CM

## 2024-08-08 PROCEDURE — 99213 OFFICE O/P EST LOW 20 MIN: CPT | Performed by: PODIATRIST

## 2024-08-08 PROCEDURE — 73610 X-RAY EXAM OF ANKLE: CPT | Mod: TC | Performed by: RADIOLOGY

## 2024-08-08 NOTE — LETTER
"8/8/2024      Landon Thompson  [address] [place] [address]      Dear Colleague,    Thank you for referring your patient, Landon Thompson, to the [facility]OD[facility]. Please see a copy of my visit note below.    Foot & Ankle Surgery   August 8, 2024    S:  Pt is seen today for evaluation of right ankle MRI results.  He states that something feels \"wrong\" in his ankle.  He is not having pain but he states the foot sometimes feels detached..    Vitals:    08/08/24 1334   BP: 118/68   Weight: 90.3 kg (199 lb)   '      ROS - Pos for CC.  Patient denies current nausea, vomiting, chills, fevers, belly pain, calf pain, chest pain or SOB.  Complete remainder of ROS it otherwise neg.      PE:  Gen:   No apparent distress  Eye:    Visual scanning without deficit  Ear:    Response to auditory stimuli wnl  Lung:    Non-labored breathing on RA noted  Abd:    NTND per patient report  Lymph:    Neg for pitting/non-pitting edema BLE  Vasc:    Pulses palpable, CFT minimally delayed  Neuro:    Light touch sensation intact to all sensory nerve distributions without paresthesias  Derm:    Neg for nodules, lesions or ulcerations  MSK:    Right lower extremity - the knee to ankle examination is -50 mile tenderness on palpation of the deltoid. He has increased pain at the medial recess and anterior gutter of the ankle joint as well as pain at the anterior talofibular ligament and to a lesser extent the calcaneofibular ligament. Talar tilt and anterior drawer appear to show subluxation of the joint and are painfu   Calf:    Neg for redness, swelling or tenderness      Imaging: MRI right ankle 8/2/24 - Impression:     1. Complete or near complete tear of the anterior talofibular  ligament. Moderate grade sprain of the calcaneofibular ligament.     2. Contusions of the talar dome and medial malleolus. No acute  fracture.     3. Partial tearing of the deep deltoid ligamentous complex.      Assessment:  23 year old " male with ankle instability and both talar tilt and anterior drawer stress exams with complete tear of the anterior talofibular ligament      Medical Decision Making/Plan:  Discussed etiologies, anatomy and options  1.  Ankle instability with both talar tilt and anterior drawer stress exams with complete tear of the anterior talofibular ligament  -I personally interpreted and reviewed the MRI results with the patient  -Stress x-rays were again performed today, instability again is seen  -Based on instability, the patient was referred to Dr. Pate to discuss surgical options in hopes of minimizing further degenerative changes of the joint    Follow up:  as above or sooner with acute issues           Brian Mcgill DPM [facility]  Podiatric Foot & Ankle Surgeon  [facility]  [phone]    Disclaimer: This note consists of symbols derived from keyboarding, dictation and/or voice recognition software. As a result, there may be errors in the script that have gone undetected. Please consider this when interpreting information found in this chart.      Again, thank you for allowing me to participate in the care of your patient.        Sincerely,        Brian Mcgill DPM, YOLETTE

## 2024-08-08 NOTE — PROGRESS NOTES
"Foot & Ankle Surgery   August 8, 2024    S:  Pt is seen today for evaluation of right ankle MRI results.  He states that something feels \"wrong\" in his ankle.  He is not having pain but he states the foot sometimes feels detached..    Vitals:    08/08/24 1334   BP: 118/68   Weight: 90.3 kg (199 lb)   '      ROS - Pos for CC.  Patient denies current nausea, vomiting, chills, fevers, belly pain, calf pain, chest pain or SOB.  Complete remainder of ROS it otherwise neg.      PE:  Gen:   No apparent distress  Eye:    Visual scanning without deficit  Ear:    Response to auditory stimuli wnl  Lung:    Non-labored breathing on RA noted  Abd:    NTND per patient report  Lymph:    Neg for pitting/non-pitting edema BLE  Vasc:    Pulses palpable, CFT minimally delayed  Neuro:    Light touch sensation intact to all sensory nerve distributions without paresthesias  Derm:    Neg for nodules, lesions or ulcerations  MSK:    Right lower extremity - the knee to ankle examination is -50 mile tenderness on palpation of the deltoid. He has increased pain at the medial recess and anterior gutter of the ankle joint as well as pain at the anterior talofibular ligament and to a lesser extent the calcaneofibular ligament. Talar tilt and anterior drawer appear to show subluxation of the joint and are painfu   Calf:    Neg for redness, swelling or tenderness      Imaging: MRI right ankle 8/2/24 - Impression:     1. Complete or near complete tear of the anterior talofibular  ligament. Moderate grade sprain of the calcaneofibular ligament.     2. Contusions of the talar dome and medial malleolus. No acute  fracture.     3. Partial tearing of the deep deltoid ligamentous complex.      Assessment:  23 year old male with ankle instability and both talar tilt and anterior drawer stress exams with complete tear of the anterior talofibular ligament      Medical Decision Making/Plan:  Discussed etiologies, anatomy and options  1.  Ankle instability " with both talar tilt and anterior drawer stress exams with complete tear of the anterior talofibular ligament  -I personally interpreted and reviewed the MRI results with the patient  -Stress x-rays were again performed today, instability again is seen  -Based on instability, the patient was referred to Dr. Pate to discuss surgical options in hopes of minimizing further degenerative changes of the joint    Follow up:  as above or sooner with acute issues           Brian Mcgill DPM [facility]  Podiatric Foot & Ankle Surgeon  [facility]  [phone]    Disclaimer: This note consists of symbols derived from keyboarding, dictation and/or voice recognition software. As a result, there may be errors in the script that have gone undetected. Please consider this when interpreting information found in this chart.

## 2024-08-08 NOTE — PATIENT INSTRUCTIONS
Thank you for choosing [facility] Podiatry / Foot & Ankle Surgery!    DR. FRANKLIN'S CLINIC LOCATIONS:     [facility] (Friday) TRIAGE LINE: [phone] [address] [facility]  APPOINTMENTS: [phone]   [facility] [address] RADIOLOGY: [phone]    PHYSICAL THERAPY: [phone]    SET UP SURGERY: [phone]   [facility] (Mon-Tues AM-Thurs) BILLING QUESTIONS: [phone]   [address] FAX: [phone]   [facility] [place] [address] [facility]: [phone]     You were seen today for follow-up on the MRI results and stress x-rays.  The stress x-rays show instability in the ankle joint and your MRI shows a near-total tear of the anterior talofibular ligament.  Because of instability and the likelihood of future degenerative changes in your ankle joint, I recommend surgical management.  You could follow-up with Dr. Courtney Pate in our department for further discussion of surgery.

## 2024-08-13 ENCOUNTER — MYC MEDICAL ADVICE (OUTPATIENT)
Dept: PODIATRY | Facility: CLINIC | Age: 24
End: 2024-08-13

## 2024-08-13 NOTE — TELEPHONE ENCOUNTER
Right ankle x-ray from 8/2/24 and right ankle MRI from 8/8/24 faxed to [facility] at [phone]    Robson Miller LAT

## 2024-08-22 ENCOUNTER — OFFICE VISIT (OUTPATIENT)
Dept: FAMILY MEDICINE | Facility: CLINIC | Age: 24
End: 2024-08-22
Payer: OTHER MISCELLANEOUS

## 2024-08-22 ENCOUNTER — MYC REFILL (OUTPATIENT)
Dept: FAMILY MEDICINE | Facility: CLINIC | Age: 24
End: 2024-08-22
Payer: COMMERCIAL

## 2024-08-22 VITALS
HEART RATE: 88 BPM | OXYGEN SATURATION: 99 % | DIASTOLIC BLOOD PRESSURE: 84 MMHG | SYSTOLIC BLOOD PRESSURE: 126 MMHG | WEIGHT: 199.3 LBS | BODY MASS INDEX: 29.52 KG/M2 | RESPIRATION RATE: 18 BRPM | TEMPERATURE: 97.6 F

## 2024-08-22 DIAGNOSIS — F90.0 ATTENTION DEFICIT HYPERACTIVITY DISORDER (ADHD), PREDOMINANTLY INATTENTIVE TYPE: ICD-10-CM
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
## 2024-08-22 DIAGNOSIS — Z01.818 PRE-OP EXAM: Primary | ICD-10-CM

## 2024-08-22 DIAGNOSIS — M25.571 PAIN IN JOINT, ANKLE AND FOOT, RIGHT: ICD-10-CM

## 2024-08-22 DIAGNOSIS — Z11.4 SCREENING FOR HIV (HUMAN IMMUNODEFICIENCY VIRUS): ICD-10-CM

## 2024-08-22 DIAGNOSIS — F33.42 MAJOR DEPRESSIVE DISORDER, RECURRENT, IN FULL REMISSION (H): ICD-10-CM

## 2024-08-22 DIAGNOSIS — Z13.29 SCREENING FOR THYROID DISORDER: ICD-10-CM

## 2024-08-22 DIAGNOSIS — Z11.59 NEED FOR HEPATITIS C SCREENING TEST: ICD-10-CM

## 2024-08-22 DIAGNOSIS — Z79.899 MEDICATION MANAGEMENT: ICD-10-CM

## 2024-08-22 LAB
ANION GAP SERPL CALCULATED.3IONS-SCNC: 14 MMOL/L (ref 7–15)
BUN SERPL-MCNC: 12.4 MG/DL (ref 6–20)
CALCIUM SERPL-MCNC: 9.3 MG/DL (ref 8.8–10.4)
CHLORIDE SERPL-SCNC: 103 MMOL/L (ref 98–107)
CREAT SERPL-MCNC: 0.89 MG/DL (ref 0.67–1.17)
EGFRCR SERPLBLD CKD-EPI 2021: >90 ML/MIN/1.73M2
ERYTHROCYTE [DISTWIDTH] IN BLOOD BY AUTOMATED COUNT: 11.7 % (ref 10–15)
GLUCOSE SERPL-MCNC: 152 MG/DL (ref 70–99)
HCO3 SERPL-SCNC: 24 MMOL/L (ref 22–29)
HCT VFR BLD AUTO: 42.9 % (ref 40–53)
HCV AB SERPL QL IA: NONREACTIVE
HGB BLD-MCNC: 14.7 G/DL (ref 13.3–17.7)
HIV 1+2 AB+HIV1 P24 AG SERPL QL IA: NONREACTIVE
MCH RBC QN AUTO: 29 PG (ref 26.5–33)
MCHC RBC AUTO-ENTMCNC: 34.3 G/DL (ref 31.5–36.5)
MCV RBC AUTO: 85 FL (ref 78–100)
PLATELET # BLD AUTO: 321 10E3/UL (ref 150–450)
POTASSIUM SERPL-SCNC: 3.9 MMOL/L (ref 3.4–5.3)
RBC # BLD AUTO: 5.07 10E6/UL (ref 4.4–5.9)
SODIUM SERPL-SCNC: 141 MMOL/L (ref 135–145)
TSH SERPL DL<=0.005 MIU/L-ACNC: 1.53 UIU/ML (ref 0.3–4.2)
WBC # BLD AUTO: 5.2 10E3/UL (ref 4–11)

## 2024-08-22 PROCEDURE — 84443 ASSAY THYROID STIM HORMONE: CPT | Performed by: INTERNAL MEDICINE

## 2024-08-22 PROCEDURE — 86803 HEPATITIS C AB TEST: CPT | Performed by: INTERNAL MEDICINE

## 2024-08-22 PROCEDURE — 87389 HIV-1 AG W/HIV-1&-2 AB AG IA: CPT | Performed by: INTERNAL MEDICINE

## 2024-08-22 PROCEDURE — 80048 BASIC METABOLIC PNL TOTAL CA: CPT | Performed by: INTERNAL MEDICINE

## 2024-08-22 PROCEDURE — 99214 OFFICE O/P EST MOD 30 MIN: CPT | Performed by: INTERNAL MEDICINE

## 2024-08-22 PROCEDURE — 36415 COLL VENOUS BLD VENIPUNCTURE: CPT | Performed by: INTERNAL MEDICINE

## 2024-08-22 PROCEDURE — 85027 COMPLETE CBC AUTOMATED: CPT | Performed by: INTERNAL MEDICINE

## 2024-08-22 RX ORDER — METHYLPHENIDATE HYDROCHLORIDE 54 MG/1
54 TABLET ORAL EVERY MORNING
Qty: 30 TABLET | Refills: 0 | Status: SHIPPED | OUTPATIENT
Start: 2024-08-22

## 2024-08-22 ASSESSMENT — PATIENT HEALTH QUESTIONNAIRE - PHQ9
10. IF YOU CHECKED OFF ANY PROBLEMS, HOW DIFFICULT HAVE THESE PROBLEMS MADE IT FOR YOU TO DO YOUR WORK, TAKE CARE OF THINGS AT HOME, OR GET ALONG WITH OTHER PEOPLE: SOMEWHAT DIFFICULT
SUM OF ALL RESPONSES TO PHQ QUESTIONS 1-9: 10
SUM OF ALL RESPONSES TO PHQ QUESTIONS 1-9: 10

## 2024-08-22 ASSESSMENT — PAIN SCALES - GENERAL: PAINLEVEL: NO PAIN (0)

## 2024-08-22 NOTE — RESULT ENCOUNTER NOTE
Jennifer Navarrete    This is to inform you regarding your test result.    CBC result which includes white count Hemoglobin and  Platelet Counts is normal.   Other test results are pending.          Sincerely,      Dr.Nasima Micheline MD,FACP

## 2024-08-22 NOTE — LETTER
My Depression Action Plan  Name: Landon Thompson   Date of Birth 2000  Date: 8/22/2024    My doctor: Kirill Walker   My clinic: [address] [facility], [address]  [facility] [address] [phone]            GREEN    ZONE   Good Control    What it looks like:   Things are going generally well. You have normal ups and downs. You may even feel depressed from time to time, but bad moods usually last less than a day.   What you need to do:  Continue to care for yourself (see self care plan)  Check your depression survival kit and update it as needed  Follow your physician s recommendations including any medication.  Do not stop taking medication unless you consult with your physician first.             YELLOW         ZONE Getting Worse    What it looks like:   Depression is starting to interfere with your life.   It may be hard to get out of bed; you may be starting to isolate yourself from others.  Symptoms of depression are starting to last most all day and this has happened for several days.   You may have suicidal thoughts but they are not constant.   What you need to do:     Call your care team. Your response to treatment will improve if you keep your care team informed of your progress. Yellow periods are signs an adjustment may need to be made.     Continue your self-care.  Just get dressed and ready for the day.  Don't give yourself time to talk yourself out of it.    Talk to someone in your support network.    Open up your Depression Self-Care Plan/Wellness Kit.             RED    ZONE Medical Alert - Get Help    What it looks like:   Depression is seriously interfering with your life.   You may experience these or other symptoms: You can t get out of bed most days, can t work or engage in other necessary activities, you have trouble taking care of basic hygiene, or basic responsibilities, thoughts of suicide or death that will not go away, self-injurious  behavior.     What you need to do:  Call your care team and request a same-day appointment. If they are not available (weekends or after hours) call your local crisis line, emergency room or 911.          Depression Self-Care Plan / Wellness Kit    Many people find that medication and therapy are helpful treatments for managing depression. In addition, making small changes to your everyday life can help to boost your mood and improve your wellbeing. Below are some tips for you to consider. Be sure to talk with your medical provider and/or behavioral health consultant if your symptoms are worsening or not improving.     Sleep   Sleep hygiene  means all of the habits that support good, restful sleep. It includes maintaining a consistent bedtime and wake time, using your bedroom only for sleeping or sex, and keeping the bedroom dark and free of distractions like a computer, smartphone, or television.     Develop a Healthy Routine  Maintain good hygiene. Get out of bed in the morning, make your bed, brush your teeth, take a shower, and get dressed. Don t spend too much time viewing media that makes you feel stressed. Find time to relax each day.    Exercise  Get some form of exercise every day. This will help reduce pain and release endorphins, the  feel good  chemicals in your brain. It can be as simple as just going for a walk or doing some gardening, anything that will get you moving.      Diet  Strive to eat healthy foods, including fruits and vegetables. Drink plenty of water. Avoid excessive sugar, caffeine, alcohol, and other mood-altering substances.     Stay Connected with Others  Stay in touch with friends and family members.    Manage Your Mood  Try deep breathing, massage therapy, biofeedback, or meditation. Take part in fun activities when you can. Try to find something to smile about each day.     Psychotherapy  Be open to working with a therapist if your provider recommends it.     Medication  Be sure to  take your medication as prescribed. Most anti-depressants need to be taken every day. It usually takes several weeks for medications to work. Not all medicines work for all people. It is important to follow-up with your provider to make sure you have a treatment plan that is working for you. Do not stop your medication abruptly without first discussing it with your provider.    Crisis Resources   These hotlines are for both adults and children. They and are open 24 hours a day, 7 days a week unless noted otherwise.    National Suicide Prevention Lifeline   988 or 7-928-053-KQLF (4045)    Crisis Text Line    www.crisistextline.org  Text HOME to 333259 from anywhere in the [place], anytime, about any type of crisis. A live, trained crisis counselor will receive the text and respond quickly.    [employer] Lifeline for LGBTQ Youth  A national crisis intervention and suicide lifeline for LGBTQ youth under 25. Provides a safe place to talk without judgement. Call 1-326.683.2125; text START to 566488 or visit www.thetrevorproject.org to talk to a trained counselor.    For [facility] crisis numbers, visit the [facility] website at:  https://[place].gov/dhs/people-we-serve/adults/health-care/mental-health/resources/crisis-contacts.jsp

## 2024-08-22 NOTE — PATIENT INSTRUCTIONS
Avoid aspirin 7 days before the surgery. Avoid nonsteroidal anti-inflammatory pain medication like ibuprofen, Motrin, or Aleve 7 days before the surgery.  Tylenol is okay to use for pain.  Avoid any OTC multivitamins or herbal supplement 7 days before surgery   Resume after surgery   Hold concerta on morning of surgery

## 2024-08-23 NOTE — RESULT ENCOUNTER NOTE
Jennifer Navarrete    This is to inform you regarding your test result.    Basic metabolic panel which includes electrolytes and kidney fucntion is satisfactory   Glucose which is your blood sugar is elevated.  Avoid high sugar containing food.  Recheck on next visit  Fasting sugar and Hba1c  HIV 1&2 Antibody is negative.  Hepatitis C Antibody is negative.  CBC result which includes white count Hemoglobin and  Platelet Counts is normal.         Sincerely,      Dr.Nasima Micheline MD,FACP

## 2024-09-05 ENCOUNTER — NURSE TRIAGE (OUTPATIENT)
Dept: FAMILY MEDICINE | Facility: CLINIC | Age: 24
End: 2024-09-05

## 2024-09-05 NOTE — TELEPHONE ENCOUNTER
"S-(situation): Patient calling stating, \"I had surgery about 10 days ago and the medication for constipation isn't working.\"    B-(background): had foot surgery last Tuesday, on oxycodone for pain. Stopped taking them about 8/31/24    A-(assessment): Afebrile, Last BM was 2 hrs ago and small/stringy/soft. Feeling of fullness and not able to empty stool since foot surgery since last Tuesday. Was on Oxycodone for approx 2 days after surgery. Has not taken any since 8/31/24. Has tried senna, miralax, enema. Has had hx of constipation in the past per patient report.     R-(recommendation):  Please advise on further recommendations for constipation. Writer advised increase fluids, sitz bath, (pt is unable to increase walking r/t surgery).      Reason for Disposition   Unable to have a bowel movement (BM) without using a laxative, suppository, or enema    Additional Information   Negative: Abdomen pain is main symptom and male   Negative: Abdomen pain is main symptom and female   Negative: Rectal bleeding or blood in stool is main symptom   Negative: Vomiting bile (green color)   Negative: Patient sounds very sick or weak to the triager   Negative: Constant abdominal pain lasting > 2 hours   Negative: Vomiting and abdomen looks much more swollen than usual   Negative: Rectal pain or fullness from fecal impaction (rectum full of stool) and NOT better after SITZ bath, suppository or enema   Negative: Abdomen is more swollen than usual   Negative: Last bowel movement (BM) > 4 days ago   Negative: Leaking stool   Negative: Intermittent mild abdominal pain and fever   Negative: Unable to have a bowel movement (BM) without manually removing stool (using finger to pull out stool or perform disimpaction)    Answer Assessment - Initial Assessment Questions  1. STOOL PATTERN OR FREQUENCY: \"How often do you have a bowel movement (BM)?\"  (Normal range: 3 times a day to every 3 days)  \"When was your last BM?\"        Every other to " "every day  2. STrAINING: \"Do you have to strain to have a BM?\"       Not normally   3. RECTAL PAIN: \"Does your rectum hurt when the stool comes out?\" If Yes, ask: \"Do you have hemorrhoids? How bad is the pain?\"  (Scale 1-10; or mild, moderate, severe)      no  4. STOOL COMPOSITION: \"Are the stools hard?\"       no  5. BLOOD ON STOOLS: \"Has there been any blood on the toilet tissue or on the surface of the BM?\" If Yes, ask: \"When was the last time?\"      no  6. CHRONIC CONSTIPATION: \"Is this a new problem for you?\"  If No, ask: \"How long have you had this problem?\" (days, weeks, months)       No, years.  7. CHANGES IN DIET OR HYDRATION: \"Have there been any recent changes in your diet?\" \"How much fluids are you drinking on a daily basis?\"  \"How much have you had to drink today?\"      no  8. MEDICINES: \"Have you been taking any new medicines?\" \"Are you taking any narcotic pain medicines?\" (e.g., Dilaudid, morphine, Percocet, Vicodin)      Senna, oxycodone  9. LAXATIVES: \"Have you been using any stool softeners, laxatives, or enemas?\"  If Yes, ask \"What, how often, and when was the last time?\"      Senna, miralax, suppository  10. ACTIVITY:  \"How much walking do you do every day?\"  \"Has your activity level decreased in the past week?\"         Usually I walk but the surgery is on my foot  11. CAUSE: \"What do you think is causing the constipation?\"         Pain medication  12. OTHER SYMPTOMS: \"Do you have any other symptoms?\" (e.g., abdomen pain, bloating, fever, vomiting)        no  13. MEDICAL HISTORY: \"Do you have a history of hemorrhoids, rectal fissures, or rectal surgery or rectal abscess?\"          no  14. PREGNANCY: \"Is there any chance you are pregnant?\" \"When was your last menstrual period?\"        N/A    Protocols used: Constipation-A-[place]    "

## 2024-09-05 NOTE — TELEPHONE ENCOUNTER
Called pt and advised of PCP's response below     Rosita MORALES Triage RN  [facility] Internal Medicine [facility]

## 2024-09-13 ENCOUNTER — TRANSFERRED RECORDS (OUTPATIENT)
Dept: HEALTH INFORMATION MANAGEMENT | Facility: CLINIC | Age: 24
End: 2024-09-13
Payer: COMMERCIAL

## 2024-10-09 ENCOUNTER — TRANSFERRED RECORDS (OUTPATIENT)
Dept: HEALTH INFORMATION MANAGEMENT | Facility: CLINIC | Age: 24
End: 2024-10-09
Payer: COMMERCIAL

## 2024-10-17 ENCOUNTER — MYC REFILL (OUTPATIENT)
Dept: FAMILY MEDICINE | Facility: CLINIC | Age: 24
End: 2024-10-17
Payer: COMMERCIAL

## 2024-10-17 DIAGNOSIS — F90.0 ATTENTION DEFICIT HYPERACTIVITY DISORDER (ADHD), PREDOMINANTLY INATTENTIVE TYPE: ICD-10-CM

## 2024-10-17 RX ORDER — METHYLPHENIDATE HYDROCHLORIDE 54 MG/1
54 TABLET ORAL EVERY MORNING
Qty: 30 TABLET | Refills: 0 | Status: SHIPPED | OUTPATIENT
Start: 2024-10-17 | End: 2024-10-28

## 2024-10-28 ENCOUNTER — MYC REFILL (OUTPATIENT)
Dept: FAMILY MEDICINE | Facility: CLINIC | Age: 24
End: 2024-10-28
Payer: COMMERCIAL

## 2024-10-28 DIAGNOSIS — F90.0 ATTENTION DEFICIT HYPERACTIVITY DISORDER (ADHD), PREDOMINANTLY INATTENTIVE TYPE: ICD-10-CM

## 2024-10-28 RX ORDER — METHYLPHENIDATE HYDROCHLORIDE 54 MG/1
54 TABLET ORAL EVERY MORNING
Qty: 30 TABLET | Refills: 0 | Status: SHIPPED | OUTPATIENT
Start: 2024-10-28

## 2024-10-29 ENCOUNTER — TELEPHONE (OUTPATIENT)
Dept: FAMILY MEDICINE | Facility: CLINIC | Age: 24
End: 2024-10-29

## 2024-10-29 ENCOUNTER — VIRTUAL VISIT (OUTPATIENT)
Dept: FAMILY MEDICINE | Facility: CLINIC | Age: 24
End: 2024-10-29
Payer: COMMERCIAL

## 2024-10-29 DIAGNOSIS — F90.0 ATTENTION DEFICIT HYPERACTIVITY DISORDER (ADHD), PREDOMINANTLY INATTENTIVE TYPE: Primary | ICD-10-CM

## 2024-10-29 PROCEDURE — 99213 OFFICE O/P EST LOW 20 MIN: CPT | Mod: 95 | Performed by: INTERNAL MEDICINE

## 2024-10-29 ASSESSMENT — PATIENT HEALTH QUESTIONNAIRE - PHQ9
10. IF YOU CHECKED OFF ANY PROBLEMS, HOW DIFFICULT HAVE THESE PROBLEMS MADE IT FOR YOU TO DO YOUR WORK, TAKE CARE OF THINGS AT HOME, OR GET ALONG WITH OTHER PEOPLE: NOT DIFFICULT AT ALL
SUM OF ALL RESPONSES TO PHQ QUESTIONS 1-9: 10
SUM OF ALL RESPONSES TO PHQ QUESTIONS 1-9: 10

## 2024-10-29 NOTE — PROGRESS NOTES
"Landon is a 24 year old who is being evaluated via a billable video visit.    How would you like to obtain your AVS? MyChart  If the video visit is dropped, the invitation should be resent by: Text to cell phone: [phone]  Will anyone else be joining your video visit? No      Assessment & Plan     Attention deficit hyperactivity disorder (ADHD), predominantly inattentive type      Patient has been stable on 54 mg of Concerta for several years.  He unfortunately has been unable to get the medication for about 3 weeks.  His  query is completed.  Last fill date was September 8.    The patient did change insurances and I suspect this has something to do with the hold-up.  I do see that there is a prior authorization request in process.  I spoke with our in-house prior authorization contact who states that there should be resolution to this over the next 24 to 48 hours.    I shared this information with the patient.  I did indicate that occasionally new insurance is may not cover the previous medication and a trial of a different medication might be in order.  However we note that the patient has been quite stable for many years on this current medication without ill side effects.        22 minutes spent by me on the date of the encounter doing chart review, history and exam, documentation and further activities per the note      BMI  Estimated body mass index is 29.52 kg/m  as calculated from the following:    Height as of 7/3/24: 1.75 m (5' 8.9\").    Weight as of 8/22/24: 90.4 kg (199 lb 4.8 oz).       Subjective   Landon is a 24 year old, presenting for the following health issues:  Recheck Medication        10/29/2024     3:35 PM   Additional Questions   Roomed by Grady Rowe is new to me.  Visit was scheduled for a medication check.    He takes Concerta for ADD. Dx over 10 years years ago.  Has been stable on current dose.  Since diagnosis is gradually settled on the current dose.  He has been stable on this " current dose for quite some time without ill side effects.    States he's not been able to get Concerta for a month.     He did change insurance in August.            History of Present Illness       Reason for visit:  Medication recheck   He is taking medications regularly.                   Objective    Vitals - Patient Reported  Pain Score: No Pain (0)        Physical Exam   GENERAL: alert and no distress  EYES: Eyes grossly normal to inspection.  No discharge or erythema, or obvious scleral/conjunctival abnormalities.  RESP: No audible wheeze, cough, or visible cyanosis.    SKIN: Visible skin clear. No significant rash, abnormal pigmentation or lesions.  NEURO: Cranial nerves grossly intact.  Mentation and speech appropriate for age.  PSYCH: Appropriate affect, tone, and pace of words          Video-Visit Details    Type of service:  Video Visit   Originating Location (pt. Location): Home    Distant Location (provider location):  On-site  Platform used for Video Visit: Ed  Signed Electronically by: Rex Montgomery MD

## 2024-10-30 NOTE — TELEPHONE ENCOUNTER
Central Prior Authorization Team   Phone: [phone]    PA Initiation    Medication: methylphenidate HCl ER, OSM, (CONCERTA) 54 MG CR tablet  Insurance Company: [employer] ([facility]) - Phone [phone] Fax [phone]  Pharmacy Filling the Rx: [id number] - [place], [place] - [address]  Filling Pharmacy Phone: [phone]  Filling Pharmacy Fax:    Start Date: 10/30/2024        
Prior Authorization Not Needed per Insurance    Medication: methylphenidate HCl ER, OSM, (CONCERTA) 54 MG CR tablet  Insurance Company: [employer] ([facility]) - Phone [phone] Fax [phone]  Expected CoPay:      Pharmacy Filling the Rx: [id number] - [place], [place] - [address]  Pharmacy Notified:  yes  Patient Notified:  yes- Pharmacy will contact patient when ready to /ship      No PA needed, medication is covered under plan  Called pharmacy to let them know [employer] is the plan they need to process under.         
Retail Pharmacy Prior Authorization Team   Phone: [phone]    EPA REQUEST -      
needs device and assist/rolling walker

## 2024-11-21 ENCOUNTER — TELEPHONE (OUTPATIENT)
Dept: FAMILY MEDICINE | Facility: CLINIC | Age: 24
End: 2024-11-21
Payer: COMMERCIAL

## 2024-11-21 DIAGNOSIS — F33.42 MAJOR DEPRESSIVE DISORDER, RECURRENT, IN FULL REMISSION (H): ICD-10-CM

## 2024-11-25 NOTE — TELEPHONE ENCOUNTER
Prior Authorization Not Needed per Insurance    Medication: FLUoxetine (PROZAC) 20 MG capsule is covered under patients formulary plan.        Please close encounter when finished.    Thank you,  Central Prior Authorization Team  [phone]

## 2024-12-29 ENCOUNTER — MYC REFILL (OUTPATIENT)
Dept: FAMILY MEDICINE | Facility: CLINIC | Age: 24
End: 2024-12-29
Payer: COMMERCIAL

## 2024-12-29 DIAGNOSIS — F90.0 ATTENTION DEFICIT HYPERACTIVITY DISORDER (ADHD), PREDOMINANTLY INATTENTIVE TYPE: ICD-10-CM

## 2024-12-30 RX ORDER — METHYLPHENIDATE HYDROCHLORIDE 54 MG/1
54 TABLET ORAL EVERY MORNING
Qty: 30 TABLET | Refills: 0 | Status: SHIPPED | OUTPATIENT
Start: 2024-12-30

## 2025-01-02 ENCOUNTER — TELEPHONE (OUTPATIENT)
Dept: FAMILY MEDICINE | Facility: CLINIC | Age: 25
End: 2025-01-02
Payer: COMMERCIAL

## 2025-01-02 NOTE — TELEPHONE ENCOUNTER
Prior Authorization Retail Medication Request    Medication/Dose:   Diagnosis and ICD code (if different than what is on RX):  Concerta   New/renewal/insurance change PA/secondary ins. PA:  Previously Tried and Failed:    Rationale:      Insurance   Primary: [employer] non first health  Insurance ID:  [id number]    Secondary (if applicable): [facility] choice  Insurance ID:  [id number]    Pharmacy Information (if different than what is on RX)  Name:    Phone:    Fax:    Clinic Information  Preferred routing pool for dept communication:

## 2025-01-03 NOTE — TELEPHONE ENCOUNTER
Central Prior Authorization Team - Phone: [phone]     Prior Authorization Not Needed per Insurance    Medication: METHYLPHENIDATE HCL ER (OSM) 54 MG PO TBCR  Insurance Company: [employer] ([facility]) - Phone [phone] Fax [phone]  Expected CoPay: $  25  Pharmacy Filling the Rx: [id number] - [place], [place] - [address]  Pharmacy Notified: yes called and spoke with pharmacist- they were able to resolve billing issue.  Patient Notified: yes Instructed pharmacy to notify patient once order is ready.

## 2025-02-02 ENCOUNTER — HEALTH MAINTENANCE LETTER (OUTPATIENT)
Age: 25
End: 2025-02-02

## 2025-02-20 SDOH — HEALTH STABILITY: PHYSICAL HEALTH: ON AVERAGE, HOW MANY MINUTES DO YOU ENGAGE IN EXERCISE AT THIS LEVEL?: 40 MIN

## 2025-02-20 SDOH — HEALTH STABILITY: PHYSICAL HEALTH: ON AVERAGE, HOW MANY DAYS PER WEEK DO YOU ENGAGE IN MODERATE TO STRENUOUS EXERCISE (LIKE A BRISK WALK)?: 4 DAYS

## 2025-02-20 ASSESSMENT — SOCIAL DETERMINANTS OF HEALTH (SDOH): HOW OFTEN DO YOU GET TOGETHER WITH FRIENDS OR RELATIVES?: ONCE A WEEK

## 2025-02-24 ENCOUNTER — OFFICE VISIT (OUTPATIENT)
Dept: FAMILY MEDICINE | Facility: CLINIC | Age: 25
End: 2025-02-24
Payer: COMMERCIAL

## 2025-02-24 VITALS
DIASTOLIC BLOOD PRESSURE: 83 MMHG | TEMPERATURE: 98.9 F | HEIGHT: 69 IN | OXYGEN SATURATION: 96 % | HEART RATE: 106 BPM | RESPIRATION RATE: 18 BRPM | SYSTOLIC BLOOD PRESSURE: 123 MMHG | BODY MASS INDEX: 32.14 KG/M2 | WEIGHT: 217 LBS

## 2025-02-24 DIAGNOSIS — Z00.00 ROUTINE GENERAL MEDICAL EXAMINATION AT A HEALTH CARE FACILITY: Primary | ICD-10-CM

## 2025-02-24 DIAGNOSIS — F90.0 ATTENTION DEFICIT HYPERACTIVITY DISORDER (ADHD), PREDOMINANTLY INATTENTIVE TYPE: ICD-10-CM

## 2025-02-24 DIAGNOSIS — Z13.1 SCREENING FOR DIABETES MELLITUS: ICD-10-CM

## 2025-02-24 DIAGNOSIS — L65.9 ALOPECIA: ICD-10-CM

## 2025-02-24 DIAGNOSIS — F33.1 MODERATE RECURRENT MAJOR DEPRESSION (H): ICD-10-CM

## 2025-02-24 PROBLEM — F33.42 MAJOR DEPRESSIVE DISORDER, RECURRENT, IN FULL REMISSION: Status: RESOLVED | Noted: 2022-02-16 | Resolved: 2025-02-24

## 2025-02-24 LAB
EST. AVERAGE GLUCOSE BLD GHB EST-MCNC: 111 MG/DL
HBA1C MFR BLD: 5.5 % (ref 0–5.6)

## 2025-02-24 PROCEDURE — 90471 IMMUNIZATION ADMIN: CPT | Performed by: INTERNAL MEDICINE

## 2025-02-24 PROCEDURE — 99214 OFFICE O/P EST MOD 30 MIN: CPT | Mod: 25 | Performed by: INTERNAL MEDICINE

## 2025-02-24 PROCEDURE — 99395 PREV VISIT EST AGE 18-39: CPT | Mod: 25 | Performed by: INTERNAL MEDICINE

## 2025-02-24 PROCEDURE — 83036 HEMOGLOBIN GLYCOSYLATED A1C: CPT | Performed by: INTERNAL MEDICINE

## 2025-02-24 PROCEDURE — 36415 COLL VENOUS BLD VENIPUNCTURE: CPT | Performed by: INTERNAL MEDICINE

## 2025-02-24 PROCEDURE — G2211 COMPLEX E/M VISIT ADD ON: HCPCS | Performed by: INTERNAL MEDICINE

## 2025-02-24 PROCEDURE — 90656 IIV3 VACC NO PRSV 0.5 ML IM: CPT | Performed by: INTERNAL MEDICINE

## 2025-02-24 RX ORDER — METHYLPHENIDATE HYDROCHLORIDE 54 MG/1
54 TABLET ORAL DAILY
Qty: 30 TABLET | Refills: 0 | Status: SHIPPED | OUTPATIENT
Start: 2025-02-24 | End: 2025-03-26

## 2025-02-24 RX ORDER — BUPROPION HYDROCHLORIDE 150 MG/1
150 TABLET ORAL EVERY MORNING
Qty: 90 TABLET | Refills: 3 | Status: SHIPPED | OUTPATIENT
Start: 2025-02-24

## 2025-02-24 RX ORDER — METHYLPHENIDATE HYDROCHLORIDE 54 MG/1
54 TABLET ORAL DAILY
Qty: 30 TABLET | Refills: 0 | Status: SHIPPED | OUTPATIENT
Start: 2025-03-26 | End: 2025-04-25

## 2025-02-24 RX ORDER — METHYLPHENIDATE HYDROCHLORIDE 54 MG/1
54 TABLET ORAL EVERY MORNING
Qty: 30 TABLET | Refills: 0 | Status: CANCELLED | OUTPATIENT
Start: 2025-02-24

## 2025-02-24 RX ORDER — METHYLPHENIDATE HYDROCHLORIDE 54 MG/1
54 TABLET ORAL DAILY
Qty: 30 TABLET | Refills: 0 | Status: SHIPPED | OUTPATIENT
Start: 2025-04-25 | End: 2025-05-25

## 2025-02-24 ASSESSMENT — PATIENT HEALTH QUESTIONNAIRE - PHQ9
SUM OF ALL RESPONSES TO PHQ QUESTIONS 1-9: 6
10. IF YOU CHECKED OFF ANY PROBLEMS, HOW DIFFICULT HAVE THESE PROBLEMS MADE IT FOR YOU TO DO YOUR WORK, TAKE CARE OF THINGS AT HOME, OR GET ALONG WITH OTHER PEOPLE: SOMEWHAT DIFFICULT
SUM OF ALL RESPONSES TO PHQ QUESTIONS 1-9: 6

## 2025-02-24 ASSESSMENT — PAIN SCALES - GENERAL: PAINLEVEL_OUTOF10: NO PAIN (0)

## 2025-02-24 NOTE — PATIENT INSTRUCTIONS
Patient Education   Preventive Care Advice   This is general advice given by our system to help you stay healthy. However, your care team may have specific advice just for you. Please talk to your care team about your preventive care needs.  Nutrition  Eat 5 or more servings of fruits and vegetables each day.  Try wheat bread, brown rice and whole grain pasta (instead of white bread, rice, and pasta).  Get enough calcium and vitamin D. Check the label on foods and aim for 100% of the RDA (recommended daily allowance).  Lifestyle  Exercise at least 150 minutes each week  (30 minutes a day, 5 days a week).  Do muscle strengthening activities 2 days a week. These help control your weight and prevent disease.  No smoking.  Wear sunscreen to prevent skin cancer.  Have a dental exam and cleaning every 6 months.  Yearly exams  See your health care team every year to talk about:  Any changes in your health.  Any medicines your care team has prescribed.  Preventive care, family planning, and ways to prevent chronic diseases.  Shots (vaccines)   HPV shots (up to age 26), if you've never had them before.  Hepatitis B shots (up to age 59), if you've never had them before.  COVID-19 shot: Get this shot when it's due.  Flu shot: Get a flu shot every year.  Tetanus shot: Get a tetanus shot every 10 years.  Pneumococcal, hepatitis A, and RSV shots: Ask your care team if you need these based on your risk.  Shingles shot (for age 50 and up)  General health tests  Diabetes screening:  Starting at age 35, Get screened for diabetes at least every 3 years.  If you are younger than age 35, ask your care team if you should be screened for diabetes.  Cholesterol test: At age 39, start having a cholesterol test every 5 years, or more often if advised.  Bone density scan (DEXA): At age 50, ask your care team if you should have this scan for osteoporosis (brittle bones).  Hepatitis C: Get tested at least once in your life.  STIs (sexually  transmitted infections)  Before age 24: Ask your care team if you should be screened for STIs.  After age 24: Get screened for STIs if you're at risk. You are at risk for STIs (including HIV) if:  You are sexually active with more than one person.  You don't use condoms every time.  You or a partner was diagnosed with a sexually transmitted infection.  If you are at risk for HIV, ask about PrEP medicine to prevent HIV.  Get tested for HIV at least once in your life, whether you are at risk for HIV or not.  Cancer screening tests  Cervical cancer screening: If you have a cervix, begin getting regular cervical cancer screening tests starting at age 21.  Breast cancer scan (mammogram): If you've ever had breasts, begin having regular mammograms starting at age 40. This is a scan to check for breast cancer.  Colon cancer screening: It is important to start screening for colon cancer at age 45.  Have a colonoscopy test every 10 years (or more often if you're at risk) Or, ask your provider about stool tests like a FIT test every year or Cologuard test every 3 years.  To learn more about your testing options, visit:   .  For help making a decision, visit:   https://bit.ly/wr36826.  Prostate cancer screening test: If you have a prostate, ask your care team if a prostate cancer screening test (PSA) at age 55 is right for you.  Lung cancer screening: If you are a current or former smoker ages 50 to 80, ask your care team if ongoing lung cancer screenings are right for you.  For informational purposes only. Not to replace the advice of your health care provider. Copyright   2023 [facility]. All rights reserved. Clinically reviewed by the [facility] Transitions Program. [employer] 802292 - REV 01/24.  Learning About Depression Screening  What is depression screening?  Depression screening is a way to see if you have depression symptoms. It may be done by a doctor or counselor. It's often part of a routine  "checkup. That's because your mental health is just as important as your physical health.  Depression is a mental health condition that affects how you feel, think, and act. You may:  Have less energy.  Lose interest in your daily activities.  Feel sad and grouchy for a long time.  Depression is very common. It affects people of all ages.  Many things can lead to depression. Some people become depressed after they have a stroke or find out they have a major illness like cancer or heart disease. The death of a loved one or a breakup may lead to depression. It can run in families. Most experts believe that a combination of inherited genes and stressful life events can cause it.  What happens during screening?  You may be asked to fill out a form about your depression symptoms. You and the doctor will discuss your answers. The doctor may ask you more questions to learn more about how you think, act, and feel.  What happens after screening?  If you have symptoms of depression, your doctor will talk to you about your options.  Doctors usually treat depression with medicines or counseling. Often, combining the two works best. Many people don't get help because they think that they'll get over the depression on their own. But people with depression may not get better unless they get treatment.  The cause of depression is not well understood. There may be many factors involved. But if you have depression, it's not your fault.  A serious symptom of depression is thinking about death or suicide. If you or someone you care about talks about this or about feeling hopeless, get help right away.  It's important to know that depression can be treated. Medicine, counseling, and self-care may help.  Where can you learn more?  Go to https://www.[employer]net/patiented  Enter T185 in the search box to learn more about \"Learning About Depression Screening.\"  Current as of: July 31, 2024  Content Version: 14.3    2024 [employer].   Care instructions adapted under license by your healthcare professional. If you have questions about a medical condition or this instruction, always ask your healthcare professional. [employer] disclaims any warranty or liability for your use of this information.        Axilla Units: 100

## 2025-02-24 NOTE — RESULT ENCOUNTER NOTE
The following letter pertains to your most recent diagnostic tests:    Good news! -Your hemoglobin A1c test which averages your blood sugars over the last 3 months returned normal.  No evidence for diabetes or prediabetes.         Sincerely,    Dr. Walker

## 2025-02-24 NOTE — PROGRESS NOTES
"Preventive Care Visit  [facility]  Kirill Walker MD, Internal Medicine  Feb 24, 2025      Assessment & Plan     Routine general medical examination at a health care facility      Attention deficit hyperactivity disorder (ADHD), predominantly inattentive type  Continue current Concerta, I do not think there would be much utility in increasing the dose as he is already on a high dose, discussed adding Wellbutrin to existing Prozac to improve attention  - methylphenidate HCl ER, OSM, (CONCERTA) 54 MG CR tablet; Take 1 tablet (54 mg) by mouth daily.  - methylphenidate HCl ER, OSM, (CONCERTA) 54 MG CR tablet; Take 1 tablet (54 mg) by mouth daily.  - methylphenidate HCl ER, OSM, (CONCERTA) 54 MG CR tablet; Take 1 tablet (54 mg) by mouth daily.    Moderate recurrent major depression (H)  See discussion above, continue Prozac    Alopecia  Discussed pros and cons of finasteride including the risk of sexual dysfunction and erectile problems.  As such, he did not want to start that medication today.  It turns out he regularly sees Dr. Loredo from dermatology as his general dermatologist.  I told him that Dr. Loredo happens to be a hair loss expert and he could schedule consultation with Dr. Loredo to determine if there is other options for his what sounds like androgenic alopecia.    Screening for diabetes mellitus  His glucose was surprisingly elevated at a recent preop.  I recommended that he have a hemoglobin A1c drawn today.  - Hemoglobin A1c; Future    Patient has been advised of split billing requirements and indicates understanding: Yes        BMI  Estimated body mass index is 32.14 kg/m  as calculated from the following:    Height as of this encounter: 1.75 m (5' 8.9\").    Weight as of this encounter: 98.4 kg (217 lb).   Weight management plan: Discussed healthy diet and exercise guidelines    Counseling  Appropriate preventive services were addressed with this patient via screening, " questionnaire, or discussion as appropriate for fall prevention, nutrition, physical activity, Tobacco-use cessation, social engagement, weight loss and cognition.  Checklist reviewing preventive services available has been given to the patient.  Reviewed patient's diet, addressing concerns and/or questions.   The patient's PHQ-9 score is consistent with mild depression. He was provided with information regarding depression.       FUTURE APPOINTMENTS:       - Follow-up for annual visit or as needed    Carlos Navarrete is a 24 year old, presenting for the following:  Physical, Refill Request, and Hair Loss        2/24/2025     2:43 PM   Additional Questions   Roomed by idalia          HPI        He wishes the Concerta worked better for his attention and energy?  Continues to take Prozac and reports adequately controlled mood.  He got a promotion at work.  He needed surgery for ligamentous injury of his ankle last summer.  He is losing hair on the vertex of his scalp and on the sides of his frontal scalp  His brother takes finasteride and so he inquired about this    Health Care Directive  Patient does not have a Health Care Directive: Discussed advance care planning with patient; information given to patient to review.      2/20/2025   General Health   How would you rate your overall physical health? (!) FAIR   Feel stress (tense, anxious, or unable to sleep) Only a little   (!) STRESS CONCERN      2/20/2025   Nutrition   Three or more servings of calcium each day? Yes   Diet: Regular (no restrictions)   How many servings of fruit and vegetables per day? (!) 2-3   How many sweetened beverages each day? 0-1         2/20/2025   Exercise   Days per week of moderate/strenous exercise 4 days   Average minutes spent exercising at this level 40 min         2/20/2025   Social Factors   Frequency of gathering with friends or relatives Once a week   Worry food won't last until get money to buy more No   Food not last or not  have enough money for food? No   Do you have housing? (Housing is defined as stable permanent housing and does not include staying ouside in a car, in a tent, in an abandoned building, in an overnight shelter, or couch-surfing.) No   Are you worried about losing your housing? No   Lack of transportation? No   Unable to get utilities (heat,electricity)? No   Want help with housing or utility concern? No   (!) HOUSING CONCERN PRESENT      2/20/2025   Dental   Dentist two times every year? Yes          Today's PHQ-9 Score:       2/24/2025     2:24 PM   PHQ-9 SCORE   PHQ-9 Total Score MyChart 6 (Mild depression)   PHQ-9 Total Score 6        Patient-reported         2/20/2025   Substance Use   Alcohol more than 3/day or more than 7/wk No   Do you use any other substances recreationally? No     Social History     Tobacco Use    Smoking status: Never     Passive exposure: Never    Smokeless tobacco: Never   Vaping Use    Vaping status: Never Used   Substance Use Topics    Alcohol use: Yes     Comment: 1-2 drinks per week    Drug use: Never           2/20/2025   STI Screening   New sexual partner(s) since last STI/HIV test? No         2/20/2025   Contraception/Family Planning   Questions about contraception or family planning No        Reviewed and updated as needed this visit by Provider                    Past Medical History:   Diagnosis Date    ADHD (attention deficit hyperactivity disorder), inattentive type     Depressive disorder      Past Surgical History:   Procedure Laterality Date    WISDOM TOOTH EXTRACTION       BP Readings from Last 3 Encounters:   02/24/25 123/83   08/22/24 126/84   08/08/24 118/68    Wt Readings from Last 3 Encounters:   02/24/25 98.4 kg (217 lb)   08/22/24 90.4 kg (199 lb 4.8 oz)   08/08/24 90.3 kg (199 lb)                  Patient Active Problem List   Diagnosis    Attention deficit hyperactivity disorder (ADHD), predominantly inattentive type    Moderate recurrent major depression (H)      "Past Surgical History:   Procedure Laterality Date    WISDOM TOOTH EXTRACTION         Social History     Tobacco Use    Smoking status: Never     Passive exposure: Never    Smokeless tobacco: Never   Substance Use Topics    Alcohol use: Yes     Comment: 1-2 drinks per week     No family history on file.      Current Outpatient Medications   Medication Sig Dispense Refill    buPROPion (WELLBUTRIN XL) 150 MG 24 hr tablet Take 1 tablet (150 mg) by mouth every morning. 90 tablet 3    cetirizine-pseudoePHEDrine ER (ZYRTEC-D) 5-120 MG 12 hr tablet Take 1 tablet by mouth 2 times daily 28 tablet 0    FLUoxetine (PROZAC) 20 MG capsule Take 1 capsule (20 mg) by mouth daily. 90 capsule 3    methylphenidate HCl ER, OSM, (CONCERTA) 54 MG CR tablet Take 1 tablet (54 mg) by mouth daily. 30 tablet 0    [START ON 3/26/2025] methylphenidate HCl ER, OSM, (CONCERTA) 54 MG CR tablet Take 1 tablet (54 mg) by mouth daily. 30 tablet 0    [START ON 4/25/2025] methylphenidate HCl ER, OSM, (CONCERTA) 54 MG CR tablet Take 1 tablet (54 mg) by mouth daily. 30 tablet 0    methylphenidate HCl ER, OSM, (CONCERTA) 54 MG CR tablet Take 1 tablet (54 mg) by mouth every morning. 30 tablet 0       A 10 organ systems ROS is negative other than any pertinent positives or negatives previously stated.      Objective    Exam  /83 (BP Location: Left arm, Patient Position: Sitting, Cuff Size: Adult Large)   Pulse 106   Temp 98.9  F (37.2  C) (Temporal)   Resp 18   Ht 1.75 m (5' 8.9\")   Wt 98.4 kg (217 lb)   SpO2 96%   BMI 32.14 kg/m     Estimated body mass index is 32.14 kg/m  as calculated from the following:    Height as of this encounter: 1.75 m (5' 8.9\").    Weight as of this encounter: 98.4 kg (217 lb).    Physical Exam  GENERAL: alert and no distress  EYES: Eyes grossly normal to inspection, PERRL and conjunctivae and sclerae normal  HENT: ear canals and TM's normal, nose and mouth without ulcers or lesions  NECK: no adenopathy, no asymmetry, " masses, or scars  RESP: lungs clear to auscultation - no rales, rhonchi or wheezes  CV: regular rate and rhythm, normal S1 S2, no S3 or S4, no murmur, click or rub, no peripheral edema  ABDOMEN: soft, nontender, no hepatosplenomegaly, no masses and bowel sounds normal   (male): testicles normal without atrophy or masses, no hernias, and penis normal without urethral discharge  MS: no gross musculoskeletal defects noted, no edema  SKIN: no suspicious lesions or rashes  NEURO: Normal strength and tone, mentation intact and speech normal  PSYCH: mentation appears normal, affect normal/bright        Signed Electronically by: Kirill Walker MD

## 2025-03-24 ENCOUNTER — TELEPHONE (OUTPATIENT)
Dept: FAMILY MEDICINE | Facility: CLINIC | Age: 25
End: 2025-03-24
Payer: COMMERCIAL

## 2025-03-24 NOTE — TELEPHONE ENCOUNTER
LVM joanna Navarrete to call back as Dr Walker wants him to set up a telephone or video visit in order to complete the [employer] paperwork received

## 2025-03-26 ENCOUNTER — TELEPHONE (OUTPATIENT)
Dept: FAMILY MEDICINE | Facility: CLINIC | Age: 25
End: 2025-03-26
Payer: COMMERCIAL

## 2025-03-26 NOTE — TELEPHONE ENCOUNTER
LVM asking patient to c/b as Dr Walker wants him to schedule a video or telephone visit in order to complete the forms received - also adv that per paperwork - it needs to be returned within 10 days which would be 03/31/25.

## 2025-04-03 ENCOUNTER — MYC REFILL (OUTPATIENT)
Dept: FAMILY MEDICINE | Facility: CLINIC | Age: 25
End: 2025-04-03
Payer: COMMERCIAL

## 2025-04-03 DIAGNOSIS — F90.0 ATTENTION DEFICIT HYPERACTIVITY DISORDER (ADHD), PREDOMINANTLY INATTENTIVE TYPE: ICD-10-CM

## 2025-04-03 RX ORDER — METHYLPHENIDATE HYDROCHLORIDE 54 MG/1
54 TABLET ORAL DAILY
Qty: 30 TABLET | Refills: 0 | Status: SHIPPED | OUTPATIENT
Start: 2025-04-03

## 2025-05-03 ENCOUNTER — MYC REFILL (OUTPATIENT)
Dept: FAMILY MEDICINE | Facility: CLINIC | Age: 25
End: 2025-05-03
Payer: COMMERCIAL

## 2025-05-03 DIAGNOSIS — F90.0 ATTENTION DEFICIT HYPERACTIVITY DISORDER (ADHD), PREDOMINANTLY INATTENTIVE TYPE: ICD-10-CM

## 2025-05-04 ENCOUNTER — MYC REFILL (OUTPATIENT)
Dept: FAMILY MEDICINE | Facility: CLINIC | Age: 25
End: 2025-05-04
Payer: COMMERCIAL

## 2025-05-04 DIAGNOSIS — F90.0 ATTENTION DEFICIT HYPERACTIVITY DISORDER (ADHD), PREDOMINANTLY INATTENTIVE TYPE: ICD-10-CM

## 2025-05-05 RX ORDER — METHYLPHENIDATE HYDROCHLORIDE 54 MG/1
54 TABLET ORAL DAILY
Qty: 30 TABLET | Refills: 0 | OUTPATIENT
Start: 2025-05-05

## 2025-05-05 RX ORDER — METHYLPHENIDATE HYDROCHLORIDE 54 MG/1
54 TABLET ORAL DAILY
Qty: 30 TABLET | Refills: 0 | Status: SHIPPED | OUTPATIENT
Start: 2025-05-05

## 2025-07-30 ENCOUNTER — MYC REFILL (OUTPATIENT)
Dept: FAMILY MEDICINE | Facility: CLINIC | Age: 25
End: 2025-07-30
Payer: COMMERCIAL

## 2025-07-30 DIAGNOSIS — F90.0 ATTENTION DEFICIT HYPERACTIVITY DISORDER (ADHD), PREDOMINANTLY INATTENTIVE TYPE: ICD-10-CM

## 2025-07-30 RX ORDER — METHYLPHENIDATE HYDROCHLORIDE 54 MG/1
54 TABLET ORAL DAILY
Qty: 30 TABLET | Refills: 0 | Status: SHIPPED | OUTPATIENT
Start: 2025-07-30

## 2025-08-30 ENCOUNTER — MYC REFILL (OUTPATIENT)
Dept: FAMILY MEDICINE | Facility: CLINIC | Age: 25
End: 2025-08-30
Payer: COMMERCIAL

## 2025-08-30 DIAGNOSIS — F90.0 ATTENTION DEFICIT HYPERACTIVITY DISORDER (ADHD), PREDOMINANTLY INATTENTIVE TYPE: ICD-10-CM

## 2025-08-31 ENCOUNTER — MYC REFILL (OUTPATIENT)
Dept: FAMILY MEDICINE | Facility: CLINIC | Age: 25
End: 2025-08-31
Payer: COMMERCIAL

## 2025-08-31 DIAGNOSIS — F90.0 ATTENTION DEFICIT HYPERACTIVITY DISORDER (ADHD), PREDOMINANTLY INATTENTIVE TYPE: ICD-10-CM

## 2025-09-02 RX ORDER — METHYLPHENIDATE HYDROCHLORIDE 54 MG/1
54 TABLET ORAL DAILY
Qty: 30 TABLET | Refills: 0 | OUTPATIENT
Start: 2025-09-02

## 2025-09-02 RX ORDER — METHYLPHENIDATE HYDROCHLORIDE 54 MG/1
54 TABLET ORAL EVERY MORNING
Qty: 30 TABLET | Refills: 0 | Status: SHIPPED | OUTPATIENT
Start: 2025-09-02